# Patient Record
Sex: MALE | Race: OTHER | HISPANIC OR LATINO
[De-identification: names, ages, dates, MRNs, and addresses within clinical notes are randomized per-mention and may not be internally consistent; named-entity substitution may affect disease eponyms.]

---

## 2017-01-03 ENCOUNTER — LABORATORY RESULT (OUTPATIENT)
Age: 57
End: 2017-01-03

## 2017-01-03 ENCOUNTER — APPOINTMENT (OUTPATIENT)
Dept: BARIATRICS/WEIGHT MGMT | Facility: CLINIC | Age: 57
End: 2017-01-03

## 2017-01-03 ENCOUNTER — APPOINTMENT (OUTPATIENT)
Dept: BARIATRICS | Facility: CLINIC | Age: 57
End: 2017-01-03

## 2017-01-03 VITALS
BODY MASS INDEX: 41.75 KG/M2 | WEIGHT: 315 LBS | DIASTOLIC BLOOD PRESSURE: 82 MMHG | HEIGHT: 73 IN | SYSTOLIC BLOOD PRESSURE: 140 MMHG

## 2017-01-03 VITALS — WEIGHT: 315 LBS | BODY MASS INDEX: 48.81 KG/M2

## 2017-01-03 DIAGNOSIS — Z96.41 PRESENCE OF INSULIN PUMP (EXTERNAL) (INTERNAL): ICD-10-CM

## 2017-01-03 DIAGNOSIS — Z78.9 OTHER SPECIFIED HEALTH STATUS: ICD-10-CM

## 2017-01-24 ENCOUNTER — RECORD ABSTRACTING (OUTPATIENT)
Age: 57
End: 2017-01-24

## 2017-01-24 DIAGNOSIS — Z87.442 PERSONAL HISTORY OF URINARY CALCULI: ICD-10-CM

## 2017-01-24 DIAGNOSIS — Z86.79 PERSONAL HISTORY OF OTHER DISEASES OF THE CIRCULATORY SYSTEM: ICD-10-CM

## 2017-01-24 DIAGNOSIS — Z86.39 PERSONAL HISTORY OF OTHER ENDOCRINE, NUTRITIONAL AND METABOLIC DISEASE: ICD-10-CM

## 2017-01-24 DIAGNOSIS — I49.3 VENTRICULAR PREMATURE DEPOLARIZATION: ICD-10-CM

## 2017-01-25 ENCOUNTER — APPOINTMENT (OUTPATIENT)
Dept: BARIATRICS/WEIGHT MGMT | Facility: CLINIC | Age: 57
End: 2017-01-25

## 2017-01-25 ENCOUNTER — APPOINTMENT (OUTPATIENT)
Dept: ELECTROPHYSIOLOGY | Facility: CLINIC | Age: 57
End: 2017-01-25

## 2017-01-25 VITALS — WEIGHT: 315 LBS | HEIGHT: 73 IN | BODY MASS INDEX: 41.75 KG/M2

## 2017-01-25 VITALS
SYSTOLIC BLOOD PRESSURE: 100 MMHG | DIASTOLIC BLOOD PRESSURE: 60 MMHG | BODY MASS INDEX: 47.36 KG/M2 | HEART RATE: 58 BPM | WEIGHT: 315 LBS

## 2017-01-31 ENCOUNTER — INPATIENT (INPATIENT)
Facility: HOSPITAL | Age: 57
LOS: 0 days | Discharge: HOME | DRG: 683 | End: 2017-02-01
Attending: EMERGENCY MEDICINE | Admitting: EMERGENCY MEDICINE
Payer: MEDICAID

## 2017-01-31 VITALS
WEIGHT: 315 LBS | TEMPERATURE: 98 F | HEIGHT: 75 IN | OXYGEN SATURATION: 94 % | RESPIRATION RATE: 19 BRPM | SYSTOLIC BLOOD PRESSURE: 106 MMHG | DIASTOLIC BLOOD PRESSURE: 62 MMHG | HEART RATE: 47 BPM

## 2017-01-31 DIAGNOSIS — I48.2 CHRONIC ATRIAL FIBRILLATION: ICD-10-CM

## 2017-01-31 DIAGNOSIS — E78.5 HYPERLIPIDEMIA, UNSPECIFIED: ICD-10-CM

## 2017-01-31 DIAGNOSIS — G47.33 OBSTRUCTIVE SLEEP APNEA (ADULT) (PEDIATRIC): ICD-10-CM

## 2017-01-31 DIAGNOSIS — I95.9 HYPOTENSION, UNSPECIFIED: ICD-10-CM

## 2017-01-31 DIAGNOSIS — N17.9 ACUTE KIDNEY FAILURE, UNSPECIFIED: ICD-10-CM

## 2017-01-31 DIAGNOSIS — I10 ESSENTIAL (PRIMARY) HYPERTENSION: ICD-10-CM

## 2017-01-31 DIAGNOSIS — I50.9 HEART FAILURE, UNSPECIFIED: ICD-10-CM

## 2017-01-31 DIAGNOSIS — E11.9 TYPE 2 DIABETES MELLITUS WITHOUT COMPLICATIONS: ICD-10-CM

## 2017-01-31 LAB
ALBUMIN SERPL ELPH-MCNC: 3.7 G/DL — SIGNIFICANT CHANGE UP (ref 3.3–5)
ALP SERPL-CCNC: 48 U/L — SIGNIFICANT CHANGE UP (ref 40–120)
ALT FLD-CCNC: 14 U/L — SIGNIFICANT CHANGE UP (ref 12–78)
ANION GAP SERPL CALC-SCNC: 9 MMOL/L — SIGNIFICANT CHANGE UP (ref 5–17)
ANION GAP SERPL CALC-SCNC: 9 MMOL/L — SIGNIFICANT CHANGE UP (ref 5–17)
APTT BLD: 37.4 SEC — SIGNIFICANT CHANGE UP (ref 27.5–37.4)
AST SERPL-CCNC: 13 U/L — LOW (ref 15–37)
BASOPHILS # BLD AUTO: 0.1 K/UL — SIGNIFICANT CHANGE UP (ref 0–0.2)
BASOPHILS NFR BLD AUTO: 0.9 % — SIGNIFICANT CHANGE UP (ref 0–2)
BILIRUB SERPL-MCNC: 0.4 MG/DL — SIGNIFICANT CHANGE UP (ref 0.2–1.2)
BUN SERPL-MCNC: 81 MG/DL — HIGH (ref 7–23)
BUN SERPL-MCNC: 82 MG/DL — HIGH (ref 7–23)
CALCIUM SERPL-MCNC: 8.8 MG/DL — SIGNIFICANT CHANGE UP (ref 8.5–10.1)
CALCIUM SERPL-MCNC: 9.1 MG/DL — SIGNIFICANT CHANGE UP (ref 8.5–10.1)
CHLORIDE SERPL-SCNC: 110 MMOL/L — HIGH (ref 96–108)
CHLORIDE SERPL-SCNC: 111 MMOL/L — HIGH (ref 96–108)
CO2 SERPL-SCNC: 19 MMOL/L — LOW (ref 22–31)
CO2 SERPL-SCNC: 20 MMOL/L — LOW (ref 22–31)
CREAT SERPL-MCNC: 2.5 MG/DL — HIGH (ref 0.5–1.3)
CREAT SERPL-MCNC: 2.6 MG/DL — HIGH (ref 0.5–1.3)
DIGOXIN SERPL-MCNC: 0.7 NG/ML — LOW (ref 0.8–2)
EOSINOPHIL # BLD AUTO: 0.2 K/UL — SIGNIFICANT CHANGE UP (ref 0–0.5)
EOSINOPHIL NFR BLD AUTO: 3.4 % — SIGNIFICANT CHANGE UP (ref 0–6)
GLUCOSE SERPL-MCNC: 70 MG/DL — SIGNIFICANT CHANGE UP (ref 70–99)
GLUCOSE SERPL-MCNC: 87 MG/DL — SIGNIFICANT CHANGE UP (ref 70–99)
HCT VFR BLD CALC: 37.4 % — LOW (ref 39–50)
HGB BLD-MCNC: 11.9 G/DL — LOW (ref 13–17)
INR BLD: 1.4 RATIO — HIGH (ref 0.88–1.16)
LYMPHOCYTES # BLD AUTO: 1.9 K/UL — SIGNIFICANT CHANGE UP (ref 1–3.3)
LYMPHOCYTES # BLD AUTO: 28 % — SIGNIFICANT CHANGE UP (ref 13–44)
MCHC RBC-ENTMCNC: 28.6 PG — SIGNIFICANT CHANGE UP (ref 27–34)
MCHC RBC-ENTMCNC: 31.7 GM/DL — LOW (ref 32–36)
MCV RBC AUTO: 90.2 FL — SIGNIFICANT CHANGE UP (ref 80–100)
MONOCYTES # BLD AUTO: 0.7 K/UL — SIGNIFICANT CHANGE UP (ref 0–0.9)
MONOCYTES NFR BLD AUTO: 10.4 % — HIGH (ref 1–9)
NEUTROPHILS # BLD AUTO: 3.9 K/UL — SIGNIFICANT CHANGE UP (ref 1.8–7.4)
NEUTROPHILS NFR BLD AUTO: 57.3 % — SIGNIFICANT CHANGE UP (ref 43–77)
NT-PROBNP SERPL-SCNC: 1758 PG/ML — HIGH (ref 0–125)
PLATELET # BLD AUTO: 154 K/UL — SIGNIFICANT CHANGE UP (ref 150–400)
POTASSIUM SERPL-MCNC: 5.2 MMOL/L — SIGNIFICANT CHANGE UP (ref 3.5–5.3)
POTASSIUM SERPL-MCNC: 5.6 MMOL/L — HIGH (ref 3.5–5.3)
POTASSIUM SERPL-SCNC: 5.2 MMOL/L — SIGNIFICANT CHANGE UP (ref 3.5–5.3)
POTASSIUM SERPL-SCNC: 5.6 MMOL/L — HIGH (ref 3.5–5.3)
PROT SERPL-MCNC: 8.2 G/DL — SIGNIFICANT CHANGE UP (ref 6–8.3)
PROTHROM AB SERPL-ACNC: 15.6 SEC — HIGH (ref 10–13.1)
RBC # BLD: 4.15 M/UL — LOW (ref 4.2–5.8)
RBC # FLD: 14.3 % — SIGNIFICANT CHANGE UP (ref 10.3–14.5)
SODIUM SERPL-SCNC: 139 MMOL/L — SIGNIFICANT CHANGE UP (ref 135–145)
SODIUM SERPL-SCNC: 139 MMOL/L — SIGNIFICANT CHANGE UP (ref 135–145)
TROPONIN I SERPL-MCNC: <.015 NG/ML — SIGNIFICANT CHANGE UP (ref 0.01–0.04)
WBC # BLD: 6.7 K/UL — SIGNIFICANT CHANGE UP (ref 3.8–10.5)
WBC # FLD AUTO: 6.7 K/UL — SIGNIFICANT CHANGE UP (ref 3.8–10.5)

## 2017-01-31 PROCEDURE — 99284 EMERGENCY DEPT VISIT MOD MDM: CPT

## 2017-01-31 PROCEDURE — 93010 ELECTROCARDIOGRAM REPORT: CPT

## 2017-01-31 PROCEDURE — 99223 1ST HOSP IP/OBS HIGH 75: CPT | Mod: AI,GC

## 2017-01-31 PROCEDURE — 71020: CPT | Mod: 26

## 2017-01-31 RX ORDER — SODIUM CHLORIDE 9 MG/ML
500 INJECTION INTRAMUSCULAR; INTRAVENOUS; SUBCUTANEOUS ONCE
Qty: 0 | Refills: 0 | Status: COMPLETED | OUTPATIENT
Start: 2017-01-31 | End: 2017-01-31

## 2017-01-31 RX ORDER — DEXTROSE 50 % IN WATER 50 %
12.5 SYRINGE (ML) INTRAVENOUS ONCE
Qty: 0 | Refills: 0 | Status: DISCONTINUED | OUTPATIENT
Start: 2017-01-31 | End: 2017-02-01

## 2017-01-31 RX ORDER — GLUCAGON INJECTION, SOLUTION 0.5 MG/.1ML
1 INJECTION, SOLUTION SUBCUTANEOUS ONCE
Qty: 0 | Refills: 0 | Status: DISCONTINUED | OUTPATIENT
Start: 2017-01-31 | End: 2017-02-01

## 2017-01-31 RX ORDER — SODIUM CHLORIDE 9 MG/ML
1000 INJECTION, SOLUTION INTRAVENOUS
Qty: 0 | Refills: 0 | Status: DISCONTINUED | OUTPATIENT
Start: 2017-01-31 | End: 2017-02-01

## 2017-01-31 RX ORDER — PANTOPRAZOLE SODIUM 20 MG/1
40 TABLET, DELAYED RELEASE ORAL
Qty: 0 | Refills: 0 | Status: DISCONTINUED | OUTPATIENT
Start: 2017-01-31 | End: 2017-02-01

## 2017-01-31 RX ORDER — SODIUM CHLORIDE 9 MG/ML
1000 INJECTION INTRAMUSCULAR; INTRAVENOUS; SUBCUTANEOUS
Qty: 0 | Refills: 0 | Status: COMPLETED | OUTPATIENT
Start: 2017-01-31 | End: 2017-02-01

## 2017-01-31 RX ORDER — GABAPENTIN 400 MG/1
300 CAPSULE ORAL DAILY
Qty: 0 | Refills: 0 | Status: DISCONTINUED | OUTPATIENT
Start: 2017-01-31 | End: 2017-02-01

## 2017-01-31 RX ORDER — ATORVASTATIN CALCIUM 80 MG/1
10 TABLET, FILM COATED ORAL AT BEDTIME
Qty: 0 | Refills: 0 | Status: DISCONTINUED | OUTPATIENT
Start: 2017-01-31 | End: 2017-02-01

## 2017-01-31 RX ORDER — CARVEDILOL PHOSPHATE 80 MG/1
6.25 CAPSULE, EXTENDED RELEASE ORAL
Qty: 0 | Refills: 0 | Status: DISCONTINUED | OUTPATIENT
Start: 2017-01-31 | End: 2017-02-01

## 2017-01-31 RX ORDER — ACETAMINOPHEN 500 MG
650 TABLET ORAL EVERY 6 HOURS
Qty: 0 | Refills: 0 | Status: DISCONTINUED | OUTPATIENT
Start: 2017-01-31 | End: 2017-02-01

## 2017-01-31 RX ORDER — SODIUM CHLORIDE 9 MG/ML
3 INJECTION INTRAMUSCULAR; INTRAVENOUS; SUBCUTANEOUS ONCE
Qty: 0 | Refills: 0 | Status: COMPLETED | OUTPATIENT
Start: 2017-01-31 | End: 2017-01-31

## 2017-01-31 RX ORDER — INSULIN LISPRO 100/ML
VIAL (ML) SUBCUTANEOUS
Qty: 0 | Refills: 0 | Status: DISCONTINUED | OUTPATIENT
Start: 2017-01-31 | End: 2017-02-01

## 2017-01-31 RX ORDER — DEXTROSE 50 % IN WATER 50 %
25 SYRINGE (ML) INTRAVENOUS ONCE
Qty: 0 | Refills: 0 | Status: DISCONTINUED | OUTPATIENT
Start: 2017-01-31 | End: 2017-02-01

## 2017-01-31 RX ORDER — GEMFIBROZIL 600 MG
600 TABLET ORAL
Qty: 0 | Refills: 0 | Status: DISCONTINUED | OUTPATIENT
Start: 2017-01-31 | End: 2017-02-01

## 2017-01-31 RX ORDER — CARVEDILOL PHOSPHATE 80 MG/1
12.5 CAPSULE, EXTENDED RELEASE ORAL DAILY
Qty: 0 | Refills: 0 | Status: DISCONTINUED | OUTPATIENT
Start: 2017-01-31 | End: 2017-01-31

## 2017-01-31 RX ORDER — DEXTROSE 50 % IN WATER 50 %
1 SYRINGE (ML) INTRAVENOUS ONCE
Qty: 0 | Refills: 0 | Status: DISCONTINUED | OUTPATIENT
Start: 2017-01-31 | End: 2017-02-01

## 2017-01-31 RX ORDER — APIXABAN 2.5 MG/1
5 TABLET, FILM COATED ORAL
Qty: 0 | Refills: 0 | Status: DISCONTINUED | OUTPATIENT
Start: 2017-01-31 | End: 2017-02-01

## 2017-01-31 RX ADMIN — ATORVASTATIN CALCIUM 10 MILLIGRAM(S): 80 TABLET, FILM COATED ORAL at 22:36

## 2017-01-31 RX ADMIN — SODIUM CHLORIDE 75 MILLILITER(S): 9 INJECTION INTRAMUSCULAR; INTRAVENOUS; SUBCUTANEOUS at 18:26

## 2017-01-31 RX ADMIN — APIXABAN 5 MILLIGRAM(S): 2.5 TABLET, FILM COATED ORAL at 18:39

## 2017-01-31 RX ADMIN — SODIUM CHLORIDE 500 MILLILITER(S): 9 INJECTION INTRAMUSCULAR; INTRAVENOUS; SUBCUTANEOUS at 15:31

## 2017-01-31 RX ADMIN — SODIUM CHLORIDE 3 MILLILITER(S): 9 INJECTION INTRAMUSCULAR; INTRAVENOUS; SUBCUTANEOUS at 11:50

## 2017-01-31 NOTE — ED ADULT TRIAGE NOTE - CHIEF COMPLAINT QUOTE
Pt. discharged from [facility] 1 week ago w/ CHF, went for follow up w/ PMD today and sent in for hypotension 74/40. Pt. states dizziness when getting up from sitting position.

## 2017-01-31 NOTE — H&P ADULT. - PROBLEM SELECTOR PLAN 2
-Consult Renal Dr. Jackson  -unknown baseline Cr.   -recheck AM BMP  -cont IVF  -check urine lytes  -recheck BMP for hyperkalemia tonight (likely 2/2 spironolactone vs CKD)

## 2017-01-31 NOTE — ED PROVIDER NOTE - CONSTITUTIONAL, MLM
normal... obese awake, alert, oriented to person, place, time/situation and in no apparent distress.

## 2017-01-31 NOTE — ED PROVIDER NOTE - MEDICAL DECISION MAKING DETAILS
pt sent with hypotension and bradycardia with hx of CKD, cardiomyopathy EF 25%.  cardiac work up and cardio consult

## 2017-01-31 NOTE — H&P ADULT. - HISTORY OF PRESENT ILLNESS
56 y/o male with PMH of HTN, CKD not on dialysis, DM2, Afib on digoxin and Eliquis, CHF (?EF) with cardiomyopathy (scheduled for pacemaker/defibrillator in March), MIKE on CPAP qhs presented to the ED from PCP due to dizziness 2/2 hypotension with BP 74/40. Pt stated he got dizzy and hypotensive once before during his recent [facility] hospitalization (1/2017) for acute CHF exacerbation. Pt currently feels asymptomatic after 1L NS bolus. Pt denies any current HA, dizziness, syncopal episodes, CP, palpitations or SOB.  Pt states he has 3 pillow orthopnea and is able to walk around the house unassisted without feeling SOB.  Pt denies any fever, abd pain or chills.     In the ED: VS stable: T: 98, HR: 62, BP: 92/44, RR: 19, O2 sats 100% on RA. Pt received 1L NS bolus in ED.  Labs significant for hyperkalemia at 5.6, BUN/Cr: 81/2.5  Imaging: CXR negative for acute disease.   EKG: Afib with slow ventricular response at 41 bpm, bifascicular block, RBBB, LAFB  Pt evaluated by cardiology, Dr. Oakley, in ED.

## 2017-01-31 NOTE — H&P ADULT. - PROBLEM SELECTOR PLAN 1
-Dr. Oakley evaluated pt in ED-appreciate recs  -will hold Lasix and Digoxin per cardiology  -Cont Coreg 12.5mg daily  -Monitor BP with routine vitals  -gentle hydration with NS 75cc/hr x 12 hours-reval in the AM as pt has CHF with ?EF

## 2017-01-31 NOTE — H&P ADULT. - PROBLEM SELECTOR PLAN 5
-pt has insulin pump  -cont HISS for now  -consulted Diabetic Education for management of pump  -routine accuchecks  -hypoglycemia protocol

## 2017-01-31 NOTE — H&P ADULT. - ASSESSMENT
This is a 58 y/o male with PMH of HTN, CKD not on dialysis, DM2, Afib on digoxin and Eliquis, CHF (?EF) with cardiomyopathy (scheduled for pacemaker/defibrillator in March), MIKE on CPAP qhs presented to the ED from PCP due to dizziness 2/2 hypotension with BP 74/40. Pt admitted to tele for hypotension, hyperkalemia and JAY on CKD.

## 2017-01-31 NOTE — ED PROVIDER NOTE - OBJECTIVE STATEMENT
56 y/o M with hx of hypertension, CKD, Afib on dig and Elloquis, CHF with cardiomyopathy scheduled for pacemaker/defibrilator in March.  Pt was seen at PCP office for medical clearance for bariatric surgery today when he was found to be weak and hypotensive at 70/40.  Pt's cardiologist, Dr. Oakley, was called and pt told to come to the ED for further evaluations, stabilization and cardiology consult.

## 2017-01-31 NOTE — H&P ADULT. - PMH
CHF (congestive heart failure)    Chronic atrial fibrillation    DM2 (diabetes mellitus, type 2)    HTN (hypertension)    MIKE (obstructive sleep apnea)

## 2017-01-31 NOTE — H&P ADULT. - ATTENDING COMMENTS
This is a 56 y/o male with PMH of HTN, CKD not on dialysis, DM2, Afib on digoxin and Eliquis, CHF (?EF) with cardiomyopathy (scheduled for pacemaker/defibrillator in March), MIKE on CPAP was sent by his PCP to the ED for dizziness 2/2 hypotension with BP was 74/40. Pt admitted to tele for hypotension, hyperkalemia and JAY on CKD. C/w telemetry monitoring . Recheck BMP.c/w coreg as per cardiology evaluation. Monitor renal function closely avoid nephrotoxins . Renal evaluation. C/w Eliquis. Restart Spirinolactone if hyperkalemia resolved . This is a 58 y/o male with PMH of HTN, CKD not on dialysis, DM2, Afib on digoxin and Eliquis, CHF (?EF) with cardiomyopathy (scheduled for pacemaker/defibrillator in March), MIKE on CPAP was sent by his PCP to the ED for dizziness 2/2 hypotension with BP was 74/40. Pt admitted to tele for hypotension, hyperkalemia and JAY on CKD. C/w telemetry monitoring . Recheck BMP.c/w coreg as per cardiology evaluation. Monitor renal function closely avoid nephrotoxins . Renal evaluation. C/w Eliquis. Restart Spirinolactone if hyperkalemia resolved . DM2 - c/w sliding scale coverage , Monitor CS closely . follow up with endo regarding insulin pump.

## 2017-01-31 NOTE — H&P ADULT. - PROBLEM SELECTOR PLAN 6
-cont coreg 12.5mg daily  -cont enalapril 5mg daily (with hold parameters)  -hold spironolactone 2/2 hyperkalemia  -f/u ECHO (ordered for AM)

## 2017-01-31 NOTE — ED ADULT NURSE NOTE - CHIEF COMPLAINT QUOTE
Pt. discharged from [facility] 1 week ago w/ CHF, went for follow up w/ PMD today and sent in for hypotension 74/40. Pt. states dizziness when getting up from sitting position.

## 2017-02-01 VITALS
HEART RATE: 58 BPM | DIASTOLIC BLOOD PRESSURE: 79 MMHG | RESPIRATION RATE: 18 BRPM | OXYGEN SATURATION: 98 % | TEMPERATURE: 98 F | SYSTOLIC BLOOD PRESSURE: 124 MMHG

## 2017-02-01 LAB
ANION GAP SERPL CALC-SCNC: 11 MMOL/L — SIGNIFICANT CHANGE UP (ref 5–17)
BUN SERPL-MCNC: 77 MG/DL — HIGH (ref 7–23)
CALCIUM SERPL-MCNC: 8.7 MG/DL — SIGNIFICANT CHANGE UP (ref 8.5–10.1)
CHLORIDE SERPL-SCNC: 112 MMOL/L — HIGH (ref 96–108)
CO2 SERPL-SCNC: 18 MMOL/L — LOW (ref 22–31)
CREAT SERPL-MCNC: 2.4 MG/DL — HIGH (ref 0.5–1.3)
GLUCOSE SERPL-MCNC: 115 MG/DL — HIGH (ref 70–99)
HBA1C BLD-MCNC: 8 % — HIGH (ref 4–5.6)
HCT VFR BLD CALC: 37.4 % — LOW (ref 39–50)
HGB BLD-MCNC: 11.9 G/DL — LOW (ref 13–17)
MCHC RBC-ENTMCNC: 28.4 PG — SIGNIFICANT CHANGE UP (ref 27–34)
MCHC RBC-ENTMCNC: 31.8 GM/DL — LOW (ref 32–36)
MCV RBC AUTO: 89.2 FL — SIGNIFICANT CHANGE UP (ref 80–100)
PLATELET # BLD AUTO: 128 K/UL — LOW (ref 150–400)
POTASSIUM SERPL-MCNC: 5.6 MMOL/L — HIGH (ref 3.5–5.3)
POTASSIUM SERPL-SCNC: 5.6 MMOL/L — HIGH (ref 3.5–5.3)
RBC # BLD: 4.19 M/UL — LOW (ref 4.2–5.8)
RBC # FLD: 14.2 % — SIGNIFICANT CHANGE UP (ref 10.3–14.5)
SODIUM SERPL-SCNC: 141 MMOL/L — SIGNIFICANT CHANGE UP (ref 135–145)
WBC # BLD: 5.8 K/UL — SIGNIFICANT CHANGE UP (ref 3.8–10.5)
WBC # FLD AUTO: 5.8 K/UL — SIGNIFICANT CHANGE UP (ref 3.8–10.5)

## 2017-02-01 PROCEDURE — 99239 HOSP IP/OBS DSCHRG MGMT >30: CPT

## 2017-02-01 RX ORDER — APIXABAN 2.5 MG/1
1 TABLET, FILM COATED ORAL
Qty: 60 | Refills: 0 | OUTPATIENT
Start: 2017-02-01 | End: 2017-03-03

## 2017-02-01 RX ORDER — CARVEDILOL PHOSPHATE 80 MG/1
1 CAPSULE, EXTENDED RELEASE ORAL
Qty: 0 | Refills: 0 | COMMUNITY

## 2017-02-01 RX ORDER — FUROSEMIDE 40 MG
1 TABLET ORAL
Qty: 0 | Refills: 0 | COMMUNITY

## 2017-02-01 RX ORDER — DIGOXIN 250 MCG
1 TABLET ORAL
Qty: 0 | Refills: 0 | COMMUNITY

## 2017-02-01 RX ORDER — APIXABAN 2.5 MG/1
1 TABLET, FILM COATED ORAL
Qty: 0 | Refills: 0 | COMMUNITY

## 2017-02-01 RX ORDER — CARVEDILOL PHOSPHATE 80 MG/1
1 CAPSULE, EXTENDED RELEASE ORAL
Qty: 60 | Refills: 0 | OUTPATIENT
Start: 2017-02-01 | End: 2017-03-03

## 2017-02-01 RX ORDER — INSULIN HUMAN 100 [IU]/ML
1 INJECTION, SOLUTION SUBCUTANEOUS
Qty: 0 | Refills: 0 | Status: DISCONTINUED | OUTPATIENT
Start: 2017-02-01 | End: 2017-02-01

## 2017-02-01 RX ADMIN — PANTOPRAZOLE SODIUM 40 MILLIGRAM(S): 20 TABLET, DELAYED RELEASE ORAL at 08:22

## 2017-02-01 RX ADMIN — Medication 10 MILLIGRAM(S): at 05:26

## 2017-02-01 RX ADMIN — Medication 600 MILLIGRAM(S): at 08:21

## 2017-02-01 RX ADMIN — GABAPENTIN 300 MILLIGRAM(S): 400 CAPSULE ORAL at 11:12

## 2017-02-01 RX ADMIN — APIXABAN 5 MILLIGRAM(S): 2.5 TABLET, FILM COATED ORAL at 05:26

## 2017-02-01 RX ADMIN — SODIUM CHLORIDE 75 MILLILITER(S): 9 INJECTION INTRAMUSCULAR; INTRAVENOUS; SUBCUTANEOUS at 05:43

## 2017-02-01 NOTE — DISCHARGE NOTE ADULT - PATIENT PORTAL LINK FT
“You can access the FollowHealth Patient Portal, offered by [facility], by registering with the following website: [email or web address]”

## 2017-02-01 NOTE — DISCHARGE NOTE ADULT - PLAN OF CARE
Resolved Lasix stopped and coreg halved.  Normotensive and asymptomatic since hospitalization.   Please follow up with Dr. Oakley in 5 days  Please follow up with PMD, Dr. Montano in 1 week Decreased Eliquis to 2.5 mg BID  Stopped Lasix  At [place], Cr was 2.1. Pt Cr 2.5 today.   Please follow up with PMD, Dr. Montano in 1 week Well controlled  Continue home medications and maintenance of insulin pump As per patient, EF 20%. Scheduled for AICD placement in March  Continue carvedilol and enalapril  Monitor weight  Please follow up with cardiology, Dr. Masterson in 5 days A-fib HR 50s, asymptomatic  Continue carvedilol, Eliquis  Please follow up with cardiology in 5 days. Continue home CPAP at night Continue lipitor and gemfibrozil  Please follow up with PMD As per patient, EF 21%. Scheduled for AICD placement in March  Continue carvedilol and enalapril  Monitor weight  Please follow up with cardiology, Dr. Masterson in 5 days Continue pravastatin and gemfibrozil  Please follow up with PMD can resume lasix at home prn  Normotensive and asymptomatic since hospitalization.   Please follow up with Dr. Oakley in 5 days  Please follow up with PMD, Dr. Montano in 1 week CKDIII  Decreased Eliquis to 2.5 mg BID  Stopped Lasix  At [place], Cr was 2.1. Pt Cr 2.5 today.   Please follow up with PMD, Dr. Montano in 1 week

## 2017-02-01 NOTE — DISCHARGE NOTE ADULT - ADDITIONAL INSTRUCTIONS
Please follow up with PMD, Dr. Montano in 1 week  Please follow up with cardiologist, Dr. Oakley in 5 days

## 2017-02-01 NOTE — DISCHARGE NOTE ADULT - CARE PLAN
Principal Discharge DX:	Hypotension, unspecified hypotension type  Goal:	Resolved  Instructions for follow-up, activity and diet:	Lasix stopped and coreg halved.  Normotensive and asymptomatic since hospitalization.   Please follow up with Dr. Oakley in 5 days  Please follow up with PMD, Dr. Montano in 1 week  Secondary Diagnosis:	CKD (chronic kidney disease)  Instructions for follow-up, activity and diet:	Decreased Eliquis to 2.5 mg BID  Stopped Lasix  At [place], Cr was 2.1. Pt Cr 2.5 today.   Please follow up with PMD, Dr. Montano in 1 week  Secondary Diagnosis:	DM2 (diabetes mellitus, type 2)  Instructions for follow-up, activity and diet:	Well controlled  Continue home medications and maintenance of insulin pump  Secondary Diagnosis:	CHF (congestive heart failure)  Instructions for follow-up, activity and diet:	As per patient, EF 20%. Scheduled for AICD placement in March  Continue carvedilol and enalapril  Monitor weight  Please follow up with cardiology, Dr. Masterson in 5 days  Secondary Diagnosis:	Chronic atrial fibrillation  Instructions for follow-up, activity and diet:	A-fib HR 50s, asymptomatic  Continue carvedilol, Eliquis  Please follow up with cardiology in 5 days.  Secondary Diagnosis:	MIKE (obstructive sleep apnea)  Instructions for follow-up, activity and diet:	Continue home CPAP at night  Secondary Diagnosis:	Hyperlipemia  Instructions for follow-up, activity and diet:	Continue lipitor and gemfibrozil  Please follow up with PMD Principal Discharge DX:	Hypotension, unspecified hypotension type  Goal:	Resolved  Instructions for follow-up, activity and diet:	Lasix stopped and coreg halved.  Normotensive and asymptomatic since hospitalization.   Please follow up with Dr. Oakley in 5 days  Please follow up with PMD, Dr. Montano in 1 week  Secondary Diagnosis:	CKD (chronic kidney disease)  Instructions for follow-up, activity and diet:	Decreased Eliquis to 2.5 mg BID  Stopped Lasix  At [place], Cr was 2.1. Pt Cr 2.5 today.   Please follow up with PMD, Dr. Montano in 1 week  Secondary Diagnosis:	DM2 (diabetes mellitus, type 2)  Instructions for follow-up, activity and diet:	Well controlled  Continue home medications and maintenance of insulin pump  Secondary Diagnosis:	CHF (congestive heart failure)  Instructions for follow-up, activity and diet:	As per patient, EF 21%. Scheduled for AICD placement in March  Continue carvedilol and enalapril  Monitor weight  Please follow up with cardiology, Dr. Masterson in 5 days  Secondary Diagnosis:	Chronic atrial fibrillation  Instructions for follow-up, activity and diet:	A-fib HR 50s, asymptomatic  Continue carvedilol, Eliquis  Please follow up with cardiology in 5 days.  Secondary Diagnosis:	MIKE (obstructive sleep apnea)  Instructions for follow-up, activity and diet:	Continue home CPAP at night  Secondary Diagnosis:	Hyperlipemia  Instructions for follow-up, activity and diet:	Continue lipitor and gemfibrozil  Please follow up with PMD Principal Discharge DX:	Hypotension, unspecified hypotension type  Goal:	Resolved  Instructions for follow-up, activity and diet:	Lasix stopped and coreg halved.  Normotensive and asymptomatic since hospitalization.   Please follow up with Dr. Oakley in 5 days  Please follow up with PMD, Dr. Montano in 1 week  Secondary Diagnosis:	CKD (chronic kidney disease)  Instructions for follow-up, activity and diet:	Decreased Eliquis to 2.5 mg BID  Stopped Lasix  At [place], Cr was 2.1. Pt Cr 2.5 today.   Please follow up with PMD, Dr. Montano in 1 week  Secondary Diagnosis:	DM2 (diabetes mellitus, type 2)  Instructions for follow-up, activity and diet:	Well controlled  Continue home medications and maintenance of insulin pump  Secondary Diagnosis:	CHF (congestive heart failure)  Instructions for follow-up, activity and diet:	As per patient, EF 21%. Scheduled for AICD placement in March  Continue carvedilol and enalapril  Monitor weight  Please follow up with cardiology, Dr. Masterson in 5 days  Secondary Diagnosis:	Chronic atrial fibrillation  Instructions for follow-up, activity and diet:	A-fib HR 50s, asymptomatic  Continue carvedilol, Eliquis  Please follow up with cardiology in 5 days.  Secondary Diagnosis:	MIKE (obstructive sleep apnea)  Instructions for follow-up, activity and diet:	Continue home CPAP at night  Secondary Diagnosis:	Hyperlipemia  Instructions for follow-up, activity and diet:	Continue lipitor and gemfibrozil  Please follow up with PMD Principal Discharge DX:	Hypotension, unspecified hypotension type  Goal:	Resolved  Instructions for follow-up, activity and diet:	Lasix stopped and coreg halved.  Normotensive and asymptomatic since hospitalization.   Please follow up with Dr. Oakley in 5 days  Please follow up with PMD, Dr. Montano in 1 week  Secondary Diagnosis:	CKD (chronic kidney disease)  Instructions for follow-up, activity and diet:	Decreased Eliquis to 2.5 mg BID  Stopped Lasix  At [place], Cr was 2.1. Pt Cr 2.5 today.   Please follow up with PMD, Dr. Montano in 1 week  Secondary Diagnosis:	DM2 (diabetes mellitus, type 2)  Instructions for follow-up, activity and diet:	Well controlled  Continue home medications and maintenance of insulin pump  Secondary Diagnosis:	CHF (congestive heart failure)  Instructions for follow-up, activity and diet:	As per patient, EF 21%. Scheduled for AICD placement in March  Continue carvedilol and enalapril  Monitor weight  Please follow up with cardiology, Dr. Masterson in 5 days  Secondary Diagnosis:	Chronic atrial fibrillation  Instructions for follow-up, activity and diet:	A-fib HR 50s, asymptomatic  Continue carvedilol, Eliquis  Please follow up with cardiology in 5 days.  Secondary Diagnosis:	MIKE (obstructive sleep apnea)  Instructions for follow-up, activity and diet:	Continue home CPAP at night  Secondary Diagnosis:	Hyperlipemia  Instructions for follow-up, activity and diet:	Continue lipitor and gemfibrozil  Please follow up with PMD Principal Discharge DX:	Hypotension, unspecified hypotension type  Goal:	Resolved  Instructions for follow-up, activity and diet:	Lasix stopped and coreg halved.  Normotensive and asymptomatic since hospitalization.   Please follow up with Dr. Oakley in 5 days  Please follow up with PMD, Dr. Montano in 1 week  Secondary Diagnosis:	CKD (chronic kidney disease)  Instructions for follow-up, activity and diet:	Decreased Eliquis to 2.5 mg BID  Stopped Lasix  At [place], Cr was 2.1. Pt Cr 2.5 today.   Please follow up with PMD, Dr. Montano in 1 week  Secondary Diagnosis:	DM2 (diabetes mellitus, type 2)  Instructions for follow-up, activity and diet:	Well controlled  Continue home medications and maintenance of insulin pump  Secondary Diagnosis:	CHF (congestive heart failure)  Instructions for follow-up, activity and diet:	As per patient, EF 21%. Scheduled for AICD placement in March  Continue carvedilol and enalapril  Monitor weight  Please follow up with cardiology, Dr. Masterson in 5 days  Secondary Diagnosis:	Chronic atrial fibrillation  Instructions for follow-up, activity and diet:	A-fib HR 50s, asymptomatic  Continue carvedilol, Eliquis  Please follow up with cardiology in 5 days.  Secondary Diagnosis:	MIKE (obstructive sleep apnea)  Instructions for follow-up, activity and diet:	Continue home CPAP at night  Secondary Diagnosis:	Hyperlipemia  Instructions for follow-up, activity and diet:	Continue pravastatin and gemfibrozil  Please follow up with PMD Principal Discharge DX:	Hypotension, unspecified hypotension type  Goal:	Resolved  Instructions for follow-up, activity and diet:	can resume lasix at home prn  Normotensive and asymptomatic since hospitalization.   Please follow up with Dr. Oakley in 5 days  Please follow up with PMD, Dr. Montano in 1 week  Secondary Diagnosis:	CKD (chronic kidney disease)  Instructions for follow-up, activity and diet:	CKDIII  Decreased Eliquis to 2.5 mg BID  Stopped Lasix  At [place], Cr was 2.1. Pt Cr 2.5 today.   Please follow up with PMD, Dr. Montano in 1 week  Secondary Diagnosis:	DM2 (diabetes mellitus, type 2)  Instructions for follow-up, activity and diet:	Well controlled  Continue home medications and maintenance of insulin pump  Secondary Diagnosis:	CHF (congestive heart failure)  Instructions for follow-up, activity and diet:	As per patient, EF 21%. Scheduled for AICD placement in March  Continue carvedilol and enalapril  Monitor weight  Please follow up with cardiology, Dr. Masterson in 5 days  Secondary Diagnosis:	Chronic atrial fibrillation  Instructions for follow-up, activity and diet:	A-fib HR 50s, asymptomatic  Continue carvedilol, Eliquis  Please follow up with cardiology in 5 days.  Secondary Diagnosis:	MIKE (obstructive sleep apnea)  Instructions for follow-up, activity and diet:	Continue home CPAP at night  Secondary Diagnosis:	Hyperlipemia  Instructions for follow-up, activity and diet:	Continue pravastatin and gemfibrozil  Please follow up with PMD Principal Discharge DX:	Hypotension, unspecified hypotension type  Goal:	Resolved  Instructions for follow-up, activity and diet:	can resume lasix at home prn  Normotensive and asymptomatic since hospitalization.   Please follow up with Dr. Oakley in 5 days  Please follow up with PMD, Dr. Montano in 1 week  Secondary Diagnosis:	CKD (chronic kidney disease)  Instructions for follow-up, activity and diet:	CKDIII  Decreased Eliquis to 2.5 mg BID  Stopped Lasix  At [place], Cr was 2.1. Pt Cr 2.5 today.   Please follow up with PMD, Dr. Montano in 1 week  Secondary Diagnosis:	DM2 (diabetes mellitus, type 2)  Instructions for follow-up, activity and diet:	Well controlled  Continue home medications and maintenance of insulin pump  Secondary Diagnosis:	CHF (congestive heart failure)  Instructions for follow-up, activity and diet:	As per patient, EF 21%. Scheduled for AICD placement in March  Continue carvedilol and enalapril  Monitor weight  Please follow up with cardiology, Dr. Masterson in 5 days  Secondary Diagnosis:	Chronic atrial fibrillation  Instructions for follow-up, activity and diet:	A-fib HR 50s, asymptomatic  Continue carvedilol, Eliquis  Please follow up with cardiology in 5 days.  Secondary Diagnosis:	MIKE (obstructive sleep apnea)  Instructions for follow-up, activity and diet:	Continue home CPAP at night  Secondary Diagnosis:	Hyperlipemia  Instructions for follow-up, activity and diet:	Continue pravastatin and gemfibrozil  Please follow up with PMD Principal Discharge DX:	Hypotension, unspecified hypotension type  Goal:	Resolved  Instructions for follow-up, activity and diet:	can resume lasix at home prn  Normotensive and asymptomatic since hospitalization.   Please follow up with Dr. Oakley in 5 days  Please follow up with PMD, Dr. Montano in 1 week  Secondary Diagnosis:	CKD (chronic kidney disease)  Instructions for follow-up, activity and diet:	CKDIII  Decreased Eliquis to 2.5 mg BID  Stopped Lasix  At [place], Cr was 2.1. Pt Cr 2.5 today.   Please follow up with PMD, Dr. Montano in 1 week  Secondary Diagnosis:	DM2 (diabetes mellitus, type 2)  Instructions for follow-up, activity and diet:	Well controlled  Continue home medications and maintenance of insulin pump  Secondary Diagnosis:	CHF (congestive heart failure)  Instructions for follow-up, activity and diet:	As per patient, EF 21%. Scheduled for AICD placement in March  Continue carvedilol and enalapril  Monitor weight  Please follow up with cardiology, Dr. Masterson in 5 days  Secondary Diagnosis:	Chronic atrial fibrillation  Instructions for follow-up, activity and diet:	A-fib HR 50s, asymptomatic  Continue carvedilol, Eliquis  Please follow up with cardiology in 5 days.  Secondary Diagnosis:	MIKE (obstructive sleep apnea)  Instructions for follow-up, activity and diet:	Continue home CPAP at night  Secondary Diagnosis:	Hyperlipemia  Instructions for follow-up, activity and diet:	Continue pravastatin and gemfibrozil  Please follow up with PMD

## 2017-02-01 NOTE — DISCHARGE NOTE ADULT - MEDICATION SUMMARY - MEDICATIONS TO TAKE
I will START or STAY ON the medications listed below when I get home from the hospital:    spironolactone 25 mg oral tablet  -- 1 tab(s) by mouth once a day  -- Indication: For CHF (congestive heart failure)    Vasotec 10 mg oral tablet  -- 1 tab(s) by mouth once a day  -- Indication: For HTN (hypertension)    Eliquis 2.5 mg oral tablet  -- 1 tab(s) by mouth 2 times a day  -- Check with your doctor before becoming pregnant.  It is very important that you take or use this exactly as directed.  Do not skip doses or discontinue unless directed by your doctor.  Obtain medical advice before taking any non-prescription drugs as some may affect the action of this medication.    -- Indication: For Afib    gabapentin 300 mg oral capsule  --  by mouth once a day  -- Indication: For neuropathy    HumuLIN R (Concentrated) 500 units/mL human recombinant subcutaneous solution  -- 1 unit(s) subcutaneous once a day  -- Indication: For DM2 (diabetes mellitus, type 2)    gemfibrozil 600 mg oral tablet  -- 1 tab(s) by mouth 2 times a day  -- Indication: For DM2 (diabetes mellitus, type 2)    pravastatin 40 mg oral tablet  -- 1 tab(s) by mouth once a day  -- Indication: For Hld    carvedilol 6.25 mg oral tablet  -- 1 tab(s) by mouth 2 times a day  -- Indication: For Cad    tiZANidine 4 mg oral capsule  --  by mouth , As Needed  -- Indication: For back spasm    pantoprazole 40 mg oral delayed release tablet  -- 1 tab(s) by mouth once a day  -- Indication: For gerd

## 2017-02-01 NOTE — ADVANCED PRACTICE NURSE CONSULT - RECOMMEDATIONS
recommend endocrine if not being discharged today  Diabetes wellness referral for insulin pump class.

## 2017-02-01 NOTE — DISCHARGE NOTE ADULT - SECONDARY DIAGNOSIS.
CKD (chronic kidney disease) DM2 (diabetes mellitus, type 2) CHF (congestive heart failure) Chronic atrial fibrillation MIKE (obstructive sleep apnea) Hyperlipemia

## 2017-02-01 NOTE — ADVANCED PRACTICE NURSE CONSULT - REASON FOR CONSULT
58 yo male Adm 1/31 Hypotension   PMHx: HF, AF, HTN, MIKE   T2 DM on insulin pump U500 Never had a class only rep to set up  Endo: Dr. Johnson in [place] last seen one week ago

## 2017-02-01 NOTE — DISCHARGE NOTE ADULT - HOSPITAL COURSE
56 y/o male with PMH of HTN, CKD not on dialysis, DM2, Afib on digoxin and Eliquis, CHF (EF 20%) with cardiomyopathy (scheduled for pacemaker/defibrillator in March), MIKE on CPAP qhs presented to the ED from PCP due to dizziness 2/2 hypotension with BP 74/40. Pt stated he got dizzy and hypotensive once before during his recent [place] hospitalization (1/2017) for acute CHF exacerbation. Pt felt asymptomatic after 1L NS bolus. Pt denies any current HA, dizziness, syncopal episodes, CP, palpitations or SOB.  Pt states he has 3 pillow orthopnea and is able to walk around the house unassisted without feeling SOB.  Pt denied any fever, abd pain or chills.     In the ED: VS stable: T: 98, HR: 62, BP: 92/44, RR: 19, O2 sats 100% on RA. Pt received 1L NS bolus in ED.  Labs significant for hyperkalemia at 5.6, BUN/Cr: 81/2.5  Imaging: CXR negative for acute disease.   EKG: Afib with slow ventricular response at 41 bpm, bifascicular block, RBBB, LAFB  Pt evaluated by cardiology, Dr. Oakley, in ED.    Patient admitted to telemetry floor for hypotension, hyperkalemia, JAY on CKD. The patient was seen by Dr. Masterson, cardiology. Lasix and digoxin were stopped. Coreg dosage was decreased. The patient was normotensive and asymptomatic since being on the unit. The patient is scheduled to have an AICD placed in March. The patient. The patient has CKD3 which was at baseline from previous admission at [facility], as per Dr. Masterson. The patient was in bradycardic A-fib in the 50s. Asymptomatic. The patient was continued on carvedilol. The patient's Eliquis was halved due to renal function. The patient had DM2 with an insulin pump. The pump was maintained by the diabetes specialist. Diabetic neuropathy was controlled with Neurontin. HTN was controlled using enalapril and carvedilol. Chronic HLD was controlled with gemfibrozil and atorvastatin. The patient is stable for discharge home.     Pt seen and examined.  Vitals: /71, HR 72, RR 18, SPO2 100% on RA, T 97.4  General: NAD, well appearing  Cardio: irregular rhythm, bradycardic, +S1/S2, no murmurs  Lungs: clear to auscultation, no wheezes  Abdomen: +BSx4, soft, non-tender  Ext: +1 LE edema, +2 PT pulses B/L  Neuro: A+Ox3 58 y/o male with PMH of HTN, CKD not on dialysis, DM2, Afib on digoxin and Eliquis, CHF (EF 20%) with cardiomyopathy (scheduled for pacemaker/defibrillator in March), MIKE on CPAP qhs presented to the ED from PCP due to dizziness 2/2 hypotension with BP 74/40. Pt stated he got dizzy and hypotensive once before during his recent [place] hospitalization (1/2017) for acute CHF exacerbation. Pt felt asymptomatic after 1L NS bolus. Pt denies any current HA, dizziness, syncopal episodes, CP, palpitations or SOB.  Pt states he has 3 pillow orthopnea and is able to walk around the house unassisted without feeling SOB.  Pt denied any fever, abd pain or chills.     In the ED: VS stable: T: 98, HR: 62, BP: 92/44, RR: 19, O2 sats 100% on RA. Pt received 1L NS bolus in ED.  Labs significant for hyperkalemia at 5.6, BUN/Cr: 81/2.5  Imaging: CXR negative for acute disease.   EKG: Afib with slow ventricular response at 41 bpm, bifascicular block, RBBB, LAFB  Pt evaluated by cardiology, Dr. Oakley, in ED.    Patient admitted to telemetry floor for hypotension, hyperkalemia, JAY on CKD. The patient was seen by Dr. Masterson, cardiology. Lasix and digoxin were stopped. Coreg dosage was decreased. The patient was normotensive and asymptomatic since being on the unit. The patient is scheduled to have an AICD placed in March. The patient. The patient has CKD3 which was at baseline from previous admission at [facility], as per Dr. Masterson. The patient was in bradycardic A-fib in the 50s. Asymptomatic. The patient was continued on carvedilol. The patient's Eliquis was halved due to renal function. The patient had DM2 with an insulin pump. The pump was maintained by the diabetes specialist. Diabetic neuropathy was controlled with Neurontin. HTN was controlled using enalapril and carvedilol. Chronic HLD was controlled with gemfibrozil and atorvastatin. The patient is stable for discharge home.     Pt seen and examined.  Vitals: /71, HR 72, RR 18, SPO2 100% on RA, T 97.4  General: NAD, well appearing  Cardio: irregular rhythm, bradycardic, +S1/S2, no murmurs  Lungs: clear to auscultation, no wheezes  Abdomen: +BSx4, soft, non-tender  Ext: +1 LE edema, +1 PT pulses B/L  Neuro: A+Ox3 56 y/o male with PMH of HTN, CKD not on dialysis, DM2, Afib on digoxin and Eliquis, CHF (EF 21%) with cardiomyopathy (scheduled for pacemaker/defibrillator in March), MIKE on CPAP qhs presented to the ED from PCP due to dizziness 2/2 hypotension with BP 74/40. Pt stated he got dizzy and hypotensive once before during his recent [place] hospitalization (1/2017) for acute CHF exacerbation. Pt felt asymptomatic after 1L NS bolus. Pt denies any current HA, dizziness, syncopal episodes, CP, palpitations or SOB.  Pt states he has 3 pillow orthopnea and is able to walk around the house unassisted without feeling SOB.  Pt denied any fever, abd pain or chills.     In the ED: VS stable: T: 98, HR: 62, BP: 92/44, RR: 19, O2 sats 100% on RA. Pt received 1L NS bolus in ED.  Labs significant for hyperkalemia at 5.6, BUN/Cr: 81/2.5  Imaging: CXR negative for acute disease.   EKG: Afib with slow ventricular response at 41 bpm, bifascicular block, RBBB, LAFB  Pt evaluated by cardiology, Dr. Oakley, in ED.    Patient admitted to telemetry floor for hypotension, hyperkalemia, JAY on CKD. The patient was seen by Dr. Masterson, cardiology. Lasix and digoxin were stopped. Coreg dosage was decreased. The patient was normotensive and asymptomatic since being on the unit. The patient is scheduled to have an AICD placed in March. The patient. The patient has CKD3 which was at baseline from previous admission at [facility], as per Dr. Masterson. The patient was in bradycardic A-fib in the 50s. Asymptomatic. The patient was continued on carvedilol. The patient's Eliquis was halved due to renal function. The patient had DM2 with an insulin pump. The pump was maintained by the diabetes specialist. Diabetic neuropathy was controlled with Neurontin. HTN was controlled using enalapril and carvedilol. Chronic HLD was controlled with gemfibrozil and atorvastatin. The patient is stable for discharge home.     Pt seen and examined.  Vitals: /71, HR 72, RR 18, SPO2 100% on RA, T 97.4  General: NAD, well appearing  Cardio: irregular rhythm, bradycardic, +S1/S2, no murmurs  Lungs: clear to auscultation, no wheezes  Abdomen: +BSx4, soft, non-tender  Ext: +1 LE edema, +1 PT pulses B/L  Neuro: A+Ox3

## 2017-02-01 NOTE — DISCHARGE NOTE ADULT - CARE PROVIDER_API CALL
Navarro Montano (DO), Family Medicine  [address] [place] [address]  Phone: [phone]  Fax: [phone]    Ryan Oakley (DO), Cardiovascular Disease; Internal Medicine; Interventional Cardiology  [address] [place] [address] [address] [place] [address]  Phone: [phone]  Fax: [phone]

## 2017-02-01 NOTE — DISCHARGE NOTE ADULT - NS AS ACTIVITY OBS
Walking-Outdoors allowed/Showering allowed/No Heavy lifting/straining/Driving allowed/Walking-Indoors allowed

## 2017-02-01 NOTE — DISCHARGE NOTE ADULT - MEDICATION SUMMARY - MEDICATIONS TO STOP TAKING
I will STOP taking the medications listed below when I get home from the hospital:    digoxin 125 mcg (0.125 mg) oral tablet  -- 1 tab(s) by mouth once a day    enalapril 10 mg oral tablet  --  by mouth 2 times a day

## 2017-02-06 DIAGNOSIS — Z87.891 PERSONAL HISTORY OF NICOTINE DEPENDENCE: ICD-10-CM

## 2017-02-06 DIAGNOSIS — E11.40 TYPE 2 DIABETES MELLITUS WITH DIABETIC NEUROPATHY, UNSPECIFIED: ICD-10-CM

## 2017-02-06 DIAGNOSIS — R00.1 BRADYCARDIA, UNSPECIFIED: ICD-10-CM

## 2017-02-06 DIAGNOSIS — I13.0 HYPERTENSIVE HEART AND CHRONIC KIDNEY DISEASE WITH HEART FAILURE AND STAGE 1 THROUGH STAGE 4 CHRONIC KIDNEY DISEASE, OR UNSPECIFIED CHRONIC KIDNEY DISEASE: ICD-10-CM

## 2017-02-06 DIAGNOSIS — E87.5 HYPERKALEMIA: ICD-10-CM

## 2017-02-06 DIAGNOSIS — E86.0 DEHYDRATION: ICD-10-CM

## 2017-02-06 DIAGNOSIS — N17.9 ACUTE KIDNEY FAILURE, UNSPECIFIED: ICD-10-CM

## 2017-02-06 DIAGNOSIS — G47.33 OBSTRUCTIVE SLEEP APNEA (ADULT) (PEDIATRIC): ICD-10-CM

## 2017-02-06 DIAGNOSIS — Z79.4 LONG TERM (CURRENT) USE OF INSULIN: ICD-10-CM

## 2017-02-06 DIAGNOSIS — I42.9 CARDIOMYOPATHY, UNSPECIFIED: ICD-10-CM

## 2017-02-06 DIAGNOSIS — N18.3 CHRONIC KIDNEY DISEASE, STAGE 3 (MODERATE): ICD-10-CM

## 2017-02-06 DIAGNOSIS — I45.2 BIFASCICULAR BLOCK: ICD-10-CM

## 2017-02-06 DIAGNOSIS — G62.9 POLYNEUROPATHY, UNSPECIFIED: ICD-10-CM

## 2017-02-06 DIAGNOSIS — E66.9 OBESITY, UNSPECIFIED: ICD-10-CM

## 2017-02-06 DIAGNOSIS — E78.5 HYPERLIPIDEMIA, UNSPECIFIED: ICD-10-CM

## 2017-02-06 DIAGNOSIS — I48.2 CHRONIC ATRIAL FIBRILLATION: ICD-10-CM

## 2017-02-06 DIAGNOSIS — Z79.01 LONG TERM (CURRENT) USE OF ANTICOAGULANTS: ICD-10-CM

## 2017-02-06 DIAGNOSIS — I50.22 CHRONIC SYSTOLIC (CONGESTIVE) HEART FAILURE: ICD-10-CM

## 2017-02-06 DIAGNOSIS — Z96.41 PRESENCE OF INSULIN PUMP (EXTERNAL) (INTERNAL): ICD-10-CM

## 2017-03-01 ENCOUNTER — OUTPATIENT (OUTPATIENT)
Dept: OUTPATIENT SERVICES | Facility: HOSPITAL | Age: 57
LOS: 1 days | End: 2017-03-01
Payer: MEDICAID

## 2017-03-01 VITALS
HEIGHT: 73 IN | DIASTOLIC BLOOD PRESSURE: 57 MMHG | TEMPERATURE: 97 F | HEART RATE: 65 BPM | RESPIRATION RATE: 20 BRPM | SYSTOLIC BLOOD PRESSURE: 89 MMHG | WEIGHT: 315 LBS

## 2017-03-01 VITALS
TEMPERATURE: 97 F | DIASTOLIC BLOOD PRESSURE: 57 MMHG | HEIGHT: 73 IN | RESPIRATION RATE: 18 BRPM | SYSTOLIC BLOOD PRESSURE: 89 MMHG | HEART RATE: 51 BPM | WEIGHT: 315 LBS | OXYGEN SATURATION: 95 %

## 2017-03-01 DIAGNOSIS — I48.2 CHRONIC ATRIAL FIBRILLATION: ICD-10-CM

## 2017-03-01 DIAGNOSIS — Z01.810 ENCOUNTER FOR PREPROCEDURAL CARDIOVASCULAR EXAMINATION: ICD-10-CM

## 2017-03-01 DIAGNOSIS — I50.9 HEART FAILURE, UNSPECIFIED: ICD-10-CM

## 2017-03-01 LAB
ANION GAP SERPL CALC-SCNC: 12 MMOL/L — SIGNIFICANT CHANGE UP (ref 5–17)
ANION GAP SERPL CALC-SCNC: 14 MMOL/L — SIGNIFICANT CHANGE UP (ref 5–17)
APTT BLD: 35.7 SEC — SIGNIFICANT CHANGE UP (ref 27.5–37.4)
BLD GP AB SCN SERPL QL: SIGNIFICANT CHANGE UP
BUN SERPL-MCNC: 85 MG/DL — HIGH (ref 8–20)
BUN SERPL-MCNC: 85 MG/DL — HIGH (ref 8–20)
CALCIUM SERPL-MCNC: 9.1 MG/DL — SIGNIFICANT CHANGE UP (ref 8.6–10.2)
CALCIUM SERPL-MCNC: 9.1 MG/DL — SIGNIFICANT CHANGE UP (ref 8.6–10.2)
CHLORIDE SERPL-SCNC: 110 MMOL/L — HIGH (ref 98–107)
CHLORIDE SERPL-SCNC: 111 MMOL/L — HIGH (ref 98–107)
CO2 SERPL-SCNC: 15 MMOL/L — LOW (ref 22–29)
CO2 SERPL-SCNC: 17 MMOL/L — LOW (ref 22–29)
CREAT SERPL-MCNC: 2.4 MG/DL — HIGH (ref 0.5–1.3)
CREAT SERPL-MCNC: 2.54 MG/DL — HIGH (ref 0.5–1.3)
GLUCOSE SERPL-MCNC: 119 MG/DL — HIGH (ref 70–115)
GLUCOSE SERPL-MCNC: 137 MG/DL — HIGH (ref 70–115)
HCT VFR BLD CALC: 38.9 % — LOW (ref 42–52)
HGB BLD-MCNC: 12.6 G/DL — LOW (ref 14–18)
HIV 1 & 2 AB SERPL IA.RAPID: SIGNIFICANT CHANGE UP
INR BLD: 1.28 RATIO — HIGH (ref 0.88–1.16)
MCHC RBC-ENTMCNC: 28.8 PG — SIGNIFICANT CHANGE UP (ref 27–31)
MCHC RBC-ENTMCNC: 32.4 G/DL — SIGNIFICANT CHANGE UP (ref 32–36)
MCV RBC AUTO: 88.8 FL — SIGNIFICANT CHANGE UP (ref 80–94)
PLATELET # BLD AUTO: 163 K/UL — SIGNIFICANT CHANGE UP (ref 150–400)
POTASSIUM SERPL-MCNC: 5.9 MMOL/L — HIGH (ref 3.5–5.3)
POTASSIUM SERPL-MCNC: 6 MMOL/L — HIGH (ref 3.5–5.3)
POTASSIUM SERPL-SCNC: 5.9 MMOL/L — HIGH (ref 3.5–5.3)
POTASSIUM SERPL-SCNC: 6 MMOL/L — HIGH (ref 3.5–5.3)
PROTHROM AB SERPL-ACNC: 14.1 SEC — HIGH (ref 10–13.1)
RBC # BLD: 4.38 M/UL — LOW (ref 4.6–6.2)
RBC # FLD: 14.7 % — SIGNIFICANT CHANGE UP (ref 11–15.6)
SODIUM SERPL-SCNC: 139 MMOL/L — SIGNIFICANT CHANGE UP (ref 135–145)
SODIUM SERPL-SCNC: 140 MMOL/L — SIGNIFICANT CHANGE UP (ref 135–145)
TYPE + AB SCN PNL BLD: SIGNIFICANT CHANGE UP
WBC # BLD: 6.6 K/UL — SIGNIFICANT CHANGE UP (ref 4.8–10.8)
WBC # FLD AUTO: 6.6 K/UL — SIGNIFICANT CHANGE UP (ref 4.8–10.8)

## 2017-03-01 PROCEDURE — 86850 RBC ANTIBODY SCREEN: CPT

## 2017-03-01 PROCEDURE — 86803 HEPATITIS C AB TEST: CPT

## 2017-03-01 PROCEDURE — 85610 PROTHROMBIN TIME: CPT

## 2017-03-01 PROCEDURE — 93010 ELECTROCARDIOGRAM REPORT: CPT

## 2017-03-01 PROCEDURE — 80048 BASIC METABOLIC PNL TOTAL CA: CPT

## 2017-03-01 PROCEDURE — 85027 COMPLETE CBC AUTOMATED: CPT

## 2017-03-01 PROCEDURE — 93005 ELECTROCARDIOGRAM TRACING: CPT

## 2017-03-01 PROCEDURE — 86900 BLOOD TYPING SEROLOGIC ABO: CPT

## 2017-03-01 PROCEDURE — G0463: CPT

## 2017-03-01 PROCEDURE — 86703 HIV-1/HIV-2 1 RESULT ANTBDY: CPT

## 2017-03-01 PROCEDURE — 86901 BLOOD TYPING SEROLOGIC RH(D): CPT

## 2017-03-01 PROCEDURE — 85730 THROMBOPLASTIN TIME PARTIAL: CPT

## 2017-03-01 RX ORDER — INSULIN HUMAN 100 [IU]/ML
1 INJECTION, SOLUTION SUBCUTANEOUS
Qty: 0 | Refills: 0 | COMMUNITY

## 2017-03-01 RX ORDER — SODIUM POLYSTYRENE SULFONATE 4.1 MEQ/G
60 POWDER, FOR SUSPENSION ORAL
Qty: 1 | Refills: 0 | OUTPATIENT
Start: 2017-03-01 | End: 2017-03-02

## 2017-03-01 RX ORDER — SODIUM POLYSTYRENE SULFONATE 4.1 MEQ/G
60 POWDER, FOR SUSPENSION ORAL
Qty: 1 | Refills: 0 | OUTPATIENT
Start: 2017-03-01

## 2017-03-01 NOTE — H&P PST ADULT - HISTORY OF PRESENT ILLNESS
57 year old male with history of CHF, afib (on eliquis), DM (insulin pump), MIKE who has been hospitalized recently for CHF and found to have NSVT on holter with EF 41%. For elective BiV ICD.  Of note...pt has history of hypotension when taking lasix.     Cardio: Adin 57 year old male with history of CHF, afib (on eliquis), DM (insulin pump), MIKE who has been hospitalized recently for CHF and found to have NSVT on holter with EF 20-25%. For elective BiV ICD.  Of note...pt has history of hypotension when taking lasix.     Cardio: Adin

## 2017-03-01 NOTE — H&P PST ADULT - PMH
CHF (congestive heart failure)    Chronic atrial fibrillation    DM2 (diabetes mellitus, type 2)    Gastritis    HTN (hypertension)    Hypotension    Obesity    MIKE (obstructive sleep apnea) CHF (congestive heart failure)    Chronic atrial fibrillation    DM2 (diabetes mellitus, type 2)    Gastritis    HTN (hypertension)    Hypotension    Obesity    MIKE (obstructive sleep apnea)    Renal insufficiency

## 2017-03-01 NOTE — H&P PST ADULT - ASSESSMENT
57 year old male 57 year old male with history of CHF, 57 year old male with history of CHF, Afib, severe LV dysfunction, NYHA class III who is referred for BiV ICD. 57 year old male with history of CHF, Afib, severe LV dysfunction, NYHA class III who is referred for BiV ICD.     Potassium level elevated.  Spoke with Dr. Baker.  Rx for kayelexate sent.  Spoke with pts wife.  Will have f/u K+ level checked on friday with Dr. Oneil.  Pt will call Dr. Baker if level does not decrease.

## 2017-03-01 NOTE — H&P PST ADULT - FAMILY HISTORY
Father  Still living? Yes, Estimated age: Age Unknown  Family history of stroke, Age at diagnosis: Age Unknown  Family history of heart disease, Age at diagnosis: Age Unknown  Family history of CHF (congestive heart failure), Age at diagnosis: Age Unknown

## 2017-03-01 NOTE — ASU PATIENT PROFILE, ADULT - PMH
CHF (congestive heart failure)    Chronic atrial fibrillation    DM2 (diabetes mellitus, type 2)    Gastritis    HTN (hypertension)    Hypotension    Obesity    MIKE (obstructive sleep apnea)    Renal insufficiency

## 2017-03-02 DIAGNOSIS — I25.10 ATHEROSCLEROTIC HEART DISEASE OF NATIVE CORONARY ARTERY WITHOUT ANGINA PECTORIS: ICD-10-CM

## 2017-03-02 DIAGNOSIS — Z01.810 ENCOUNTER FOR PREPROCEDURAL CARDIOVASCULAR EXAMINATION: ICD-10-CM

## 2017-03-02 LAB
HCV AB S/CO SERPL IA: 0.25 S/CO — SIGNIFICANT CHANGE UP
HCV AB SERPL-IMP: SIGNIFICANT CHANGE UP

## 2017-03-06 ENCOUNTER — INPATIENT (INPATIENT)
Facility: HOSPITAL | Age: 57
LOS: 0 days | Discharge: HOME | DRG: 227 | End: 2017-03-07
Attending: STUDENT IN AN ORGANIZED HEALTH CARE EDUCATION/TRAINING PROGRAM | Admitting: STUDENT IN AN ORGANIZED HEALTH CARE EDUCATION/TRAINING PROGRAM
Payer: MEDICAID

## 2017-03-06 ENCOUNTER — TRANSCRIPTION ENCOUNTER (OUTPATIENT)
Age: 57
End: 2017-03-06

## 2017-03-06 VITALS
DIASTOLIC BLOOD PRESSURE: 75 MMHG | HEART RATE: 70 BPM | TEMPERATURE: 98 F | OXYGEN SATURATION: 96 % | RESPIRATION RATE: 16 BRPM | SYSTOLIC BLOOD PRESSURE: 106 MMHG

## 2017-03-06 DIAGNOSIS — I50.9 HEART FAILURE, UNSPECIFIED: ICD-10-CM

## 2017-03-06 DIAGNOSIS — Z01.810 ENCOUNTER FOR PREPROCEDURAL CARDIOVASCULAR EXAMINATION: ICD-10-CM

## 2017-03-06 LAB
ANION GAP SERPL CALC-SCNC: 14 MMOL/L — SIGNIFICANT CHANGE UP (ref 5–17)
BLD GP AB SCN SERPL QL: SIGNIFICANT CHANGE UP
BUN SERPL-MCNC: 85 MG/DL — HIGH (ref 8–20)
CALCIUM SERPL-MCNC: 9.3 MG/DL — SIGNIFICANT CHANGE UP (ref 8.6–10.2)
CHLORIDE SERPL-SCNC: 106 MMOL/L — SIGNIFICANT CHANGE UP (ref 98–107)
CO2 SERPL-SCNC: 17 MMOL/L — LOW (ref 22–29)
CREAT SERPL-MCNC: 2.31 MG/DL — HIGH (ref 0.5–1.3)
GLUCOSE SERPL-MCNC: 83 MG/DL — SIGNIFICANT CHANGE UP (ref 70–115)
POTASSIUM SERPL-MCNC: 5.3 MMOL/L — SIGNIFICANT CHANGE UP (ref 3.5–5.3)
POTASSIUM SERPL-SCNC: 5.3 MMOL/L — SIGNIFICANT CHANGE UP (ref 3.5–5.3)
SODIUM SERPL-SCNC: 137 MMOL/L — SIGNIFICANT CHANGE UP (ref 135–145)
TYPE + AB SCN PNL BLD: SIGNIFICANT CHANGE UP

## 2017-03-06 PROCEDURE — 33225 L VENTRIC PACING LEAD ADD-ON: CPT

## 2017-03-06 PROCEDURE — 71010: CPT | Mod: 26

## 2017-03-06 PROCEDURE — 33249 INSJ/RPLCMT DEFIB W/LEAD(S): CPT

## 2017-03-06 PROCEDURE — 93010 ELECTROCARDIOGRAM REPORT: CPT

## 2017-03-06 PROCEDURE — 71020: CPT | Mod: 26

## 2017-03-06 RX ORDER — CARVEDILOL PHOSPHATE 80 MG/1
12.5 CAPSULE, EXTENDED RELEASE ORAL EVERY 12 HOURS
Qty: 0 | Refills: 0 | Status: DISCONTINUED | OUTPATIENT
Start: 2017-03-06 | End: 2017-03-07

## 2017-03-06 RX ORDER — GEMFIBROZIL 600 MG
600 TABLET ORAL
Qty: 0 | Refills: 0 | Status: DISCONTINUED | OUTPATIENT
Start: 2017-03-06 | End: 2017-03-07

## 2017-03-06 RX ORDER — ONDANSETRON 8 MG/1
4 TABLET, FILM COATED ORAL EVERY 6 HOURS
Qty: 0 | Refills: 0 | Status: DISCONTINUED | OUTPATIENT
Start: 2017-03-06 | End: 2017-03-07

## 2017-03-06 RX ORDER — DEXTROSE 50 % IN WATER 50 %
25 SYRINGE (ML) INTRAVENOUS ONCE
Qty: 0 | Refills: 0 | Status: DISCONTINUED | OUTPATIENT
Start: 2017-03-06 | End: 2017-03-07

## 2017-03-06 RX ORDER — DEXTROSE 50 % IN WATER 50 %
1 SYRINGE (ML) INTRAVENOUS ONCE
Qty: 0 | Refills: 0 | Status: DISCONTINUED | OUTPATIENT
Start: 2017-03-06 | End: 2017-03-07

## 2017-03-06 RX ORDER — GABAPENTIN 400 MG/1
300 CAPSULE ORAL DAILY
Qty: 0 | Refills: 0 | Status: DISCONTINUED | OUTPATIENT
Start: 2017-03-06 | End: 2017-03-07

## 2017-03-06 RX ORDER — CARVEDILOL PHOSPHATE 80 MG/1
6.25 CAPSULE, EXTENDED RELEASE ORAL EVERY 12 HOURS
Qty: 0 | Refills: 0 | Status: DISCONTINUED | OUTPATIENT
Start: 2017-03-06 | End: 2017-03-06

## 2017-03-06 RX ORDER — SPIRONOLACTONE 25 MG/1
25 TABLET, FILM COATED ORAL DAILY
Qty: 0 | Refills: 0 | Status: DISCONTINUED | OUTPATIENT
Start: 2017-03-06 | End: 2017-03-07

## 2017-03-06 RX ORDER — ACETAMINOPHEN 500 MG
650 TABLET ORAL EVERY 6 HOURS
Qty: 0 | Refills: 0 | Status: DISCONTINUED | OUTPATIENT
Start: 2017-03-06 | End: 2017-03-07

## 2017-03-06 RX ORDER — DEXTROSE 50 % IN WATER 50 %
12.5 SYRINGE (ML) INTRAVENOUS ONCE
Qty: 0 | Refills: 0 | Status: DISCONTINUED | OUTPATIENT
Start: 2017-03-06 | End: 2017-03-07

## 2017-03-06 RX ORDER — ALPRAZOLAM 0.25 MG
0.25 TABLET ORAL EVERY 6 HOURS
Qty: 0 | Refills: 0 | Status: DISCONTINUED | OUTPATIENT
Start: 2017-03-06 | End: 2017-03-07

## 2017-03-06 RX ORDER — ATORVASTATIN CALCIUM 80 MG/1
10 TABLET, FILM COATED ORAL AT BEDTIME
Qty: 0 | Refills: 0 | Status: DISCONTINUED | OUTPATIENT
Start: 2017-03-06 | End: 2017-03-07

## 2017-03-06 RX ORDER — INSULIN HUMAN 100 [IU]/ML
1 INJECTION, SOLUTION SUBCUTANEOUS
Qty: 0 | Refills: 0 | Status: DISCONTINUED | OUTPATIENT
Start: 2017-03-06 | End: 2017-03-07

## 2017-03-06 RX ORDER — GLUCAGON INJECTION, SOLUTION 0.5 MG/.1ML
1 INJECTION, SOLUTION SUBCUTANEOUS ONCE
Qty: 0 | Refills: 0 | Status: DISCONTINUED | OUTPATIENT
Start: 2017-03-06 | End: 2017-03-07

## 2017-03-06 RX ORDER — PANTOPRAZOLE SODIUM 20 MG/1
40 TABLET, DELAYED RELEASE ORAL
Qty: 0 | Refills: 0 | Status: DISCONTINUED | OUTPATIENT
Start: 2017-03-06 | End: 2017-03-07

## 2017-03-06 RX ORDER — SODIUM CHLORIDE 9 MG/ML
1000 INJECTION, SOLUTION INTRAVENOUS
Qty: 0 | Refills: 0 | Status: DISCONTINUED | OUTPATIENT
Start: 2017-03-06 | End: 2017-03-07

## 2017-03-06 RX ORDER — CEFAZOLIN SODIUM 1 G
2000 VIAL (EA) INJECTION EVERY 8 HOURS
Qty: 0 | Refills: 0 | Status: COMPLETED | OUTPATIENT
Start: 2017-03-06 | End: 2017-03-07

## 2017-03-06 RX ADMIN — Medication 100 MILLIGRAM(S): at 23:29

## 2017-03-06 RX ADMIN — ATORVASTATIN CALCIUM 10 MILLIGRAM(S): 80 TABLET, FILM COATED ORAL at 22:00

## 2017-03-06 NOTE — DISCHARGE NOTE ADULT - INSTRUCTIONS
With a diagnosis of diabetes comes a strict diet regimen to help control blood sugars by watching carbohydrate consumption. Carbohydrates, such as starches, fruits, dairy and starchy vegetables, is the food group that affects glucose levels in the body. Carefully monitoring carbohydrate intake is a main component of the diabetic diet; eating three meals each day that are roughly equivalent in size can help control blood sugars. A registered dietitian can help you plan a diet customized to your individual needs.  Choose lean meats and poultry without skin and prepare them without added saturated and trans fat.  Eat fish at least twice a week. Recent research shows that eating oily fish containing omega-3 fatty acids (for example, salmon, trout and herring) may help lower your risk of death from coronary artery disease.  Select fat-free, 1 percent fat and low-fat dairy products.  Cut back on foods containing partially hydrogenated vegetable oils to reduce trans fat in your diet.   To lower cholesterol, reduce saturated fat to no more than 5 to 6 percent of total calories. For someone eating 2,000 calories a day, that’s about 13 grams of saturated fat.  Cut back on beverages and foods with added sugars.  Choose and prepare foods with little or no salt. To lower blood pressure, aim to eat no more than 2,400 milligrams of sodium per day. Reducing daily intake to 1,500 mg is desirable because it can lower blood pressure even further.  If you drink alcohol, drink in moderation. That means one drink per day if you’re a woman and two drinks  per day if you’re a man.  Follow the American Heart Association recommendations when you eat out, and keep an eye on your portion sizes.

## 2017-03-06 NOTE — DISCHARGE NOTE ADULT - PROVIDER TOKENS
FREE:[LAST:[Olivia],FIRST:[Pratik],PHONE:[phone]],FAX:[phone]],ADDRESS:[[facility]  [address] [place] [address]]] FREE:[LAST:[Olivia],FIRST:[Pratik],PHONE:[phone]],FAX:[(   )    -],ADDRESS:[[address], [place] [address]]]

## 2017-03-06 NOTE — DISCHARGE NOTE ADULT - PLAN OF CARE
optimize cardiac health Cardiac Device Implant Post Operative Instructions  - Bruising around the implant site or over the chest, side or arm near the incision is normal, and will take a few weeks to resolve.  -Do not lift the affected arm higher than 90 degrees (shoulder height) in any direction for 4 weeks.   - Do not push, pull or lift anything heavier than 10 lbs (about a gallon of milk) with the affected arm for 4 weeks.     - Do not touch the incision until it is completely healed.   - There are Steristrips (white strips of tape) on your incision, which will start to peal off on their own over the next 2-3 weeks. Do not pick at or peal off the Steristrips.   - Do not apply soaps, creams, lotions, ointments or powders to the incision until it is completely healed.  You should call the doctor if:   - you notice redness, drainage, swelling, increased tenderness, hot sensation around the  incision, bleeding or incision edges pulling apart.  - your temperature is greater than 100 degress F for more than 24 hours.  - you notice swelling or bulging at the incision or around the device that was not there when you left the hospital or is increasing in size.  -you experience increased difficulty breathing.  - you notice new/worsening swelling in your legs and ankles.  - you faint or have dizzy spells.  -you have any questions or concerns regarding your device or the procedure.

## 2017-03-06 NOTE — DISCHARGE NOTE ADULT - HOSPITAL COURSE
57 year old gentleman with history of NYHA class III chronic systolic congestive heart failure, severe LV dysfunction with LVEF 20-25% despite optimal medical therapy, and repeated heart failure exacerbations requiring hospitalization. He also has persistent atrial fibrillation with relatively slow ventricular rates, and evidence of conduction system disease with bifascicular block on ECG. Implantation of a biventricular ICD was recommended for primary prevention of sudden death and cardiac resynchronization therapy. The patient presented electively 3/6/17 for BiV ICD implant,  which was performed without acute complication. 57 year old gentleman with history of NYHA class III chronic systolic congestive heart failure, severe LV dysfunction with LVEF 20-25% despite optimal medical therapy, and repeated heart failure exacerbations requiring hospitalization. He also has persistent atrial fibrillation with relatively slow ventricular rates, and evidence of conduction system disease with bifascicular block on ECG. Implantation of a biventricular ICD was recommended for primary prevention of sudden death and cardiac resynchronization therapy. The patient presented electively 3/6/17 for BiV ICD implant,  which was performed without acute complication. The patient was observed overnight without event and was discharged home the following morning with a plan for outpatient follow up.

## 2017-03-06 NOTE — DISCHARGE NOTE ADULT - PATIENT PORTAL LINK FT
“You can access the Follow[employer] Patient Portal, offered by [facility], by registering with the following website: [email or web address]”

## 2017-03-06 NOTE — DISCHARGE NOTE ADULT - CARE PLAN
Principal Discharge DX:	Chronic systolic congestive heart failure  Goal:	optimize cardiac health  Instructions for follow-up, activity and diet:	Cardiac Device Implant Post Operative Instructions  - Bruising around the implant site or over the chest, side or arm near the incision is normal, and will take a few weeks to resolve.  -Do not lift the affected arm higher than 90 degrees (shoulder height) in any direction for 4 weeks.   - Do not push, pull or lift anything heavier than 10 lbs (about a gallon of milk) with the affected arm for 4 weeks.     - Do not touch the incision until it is completely healed.   - There are Steristrips (white strips of tape) on your incision, which will start to peal off on their own over the next 2-3 weeks. Do not pick at or peal off the Steristrips.   - Do not apply soaps, creams, lotions, ointments or powders to the incision until it is completely healed.  You should call the doctor if:   - you notice redness, drainage, swelling, increased tenderness, hot sensation around the  incision, bleeding or incision edges pulling apart.  - your temperature is greater than 100 degress F for more than 24 hours.  - you notice swelling or bulging at the incision or around the device that was not there when you left the hospital or is increasing in size.  -you experience increased difficulty breathing.  - you notice new/worsening swelling in your legs and ankles.  - you faint or have dizzy spells.  -you have any questions or concerns regarding your device or the procedure.  Secondary Diagnosis:	Atrial fibrillation with slow ventricular response

## 2017-03-06 NOTE — DISCHARGE NOTE ADULT - CARE PROVIDERS DIRECT ADDRESSES
,DirectAddress_Unknown,[email or web address]

## 2017-03-06 NOTE — DISCHARGE NOTE ADULT - MEDICATION SUMMARY - MEDICATIONS TO TAKE
I will START or STAY ON the medications listed below when I get home from the hospital:    spironolactone 25 mg oral tablet  -- 1 tab(s) by mouth once a day  -- Indication: For Chronic systolic congestive heart failure    acetaminophen 325 mg oral tablet  -- 2 tab(s) by mouth every 6 hours, As needed, Mild Pain (1 - 3)  -- Indication: For As needed for post op pain    Vasotec 10 mg oral tablet  -- 1 tab(s) by mouth once a day  -- Indication: For hypertension (blood pressure) and heart failure management    Eliquis 2.5 mg oral tablet  -- 1 tab(s) by mouth 2 times a day  -- Check with your doctor before becoming pregnant.  It is very important that you take or use this exactly as directed.  Do not skip doses or discontinue unless directed by your doctor.  Obtain medical advice before taking any non-prescription drugs as some may affect the action of this medication.    -- Indication: For Atrial fibrillation     gabapentin 300 mg oral capsule  --  by mouth once a day  -- Indication: For neuropathic pain    HumuLIN R (Concentrated) 500 units/mL human recombinant subcutaneous solution  -- 1 unit(s) subcutaneous every hour  basal rate  insulin pump  -- Indication: For diabetes    gemfibrozil 600 mg oral tablet  -- 1 tab(s) by mouth 2 times a day  -- Indication: For Cholesterol management    pravastatin 40 mg oral tablet  -- 1 tab(s) by mouth once a day  -- Indication: For Cholesterol management    carvedilol 12.5 mg oral tablet  -- 1 tab(s) by mouth every 12 hours  -- Indication: For heart rate control and heart failure management    Lasix 20 mg oral tablet  -- 1 tab(s) by mouth once a day, As Needed based on daily weight  -- Indication: For hypertension (blood pressure) and heart failure management    tiZANidine 4 mg oral capsule  --  by mouth , As Needed  -- Indication: For muscle spasm    pantoprazole 40 mg oral delayed release tablet  -- 1 tab(s) by mouth once a day  -- Indication: For gastric reflux

## 2017-03-06 NOTE — DISCHARGE NOTE ADULT - MEDICATION SUMMARY - MEDICATIONS TO CHANGE
I will SWITCH the dose or number of times a day I take the medications listed below when I get home from the hospital:    carvedilol 6.25 mg oral tablet  -- 1 tab(s) by mouth 2 times a day

## 2017-03-06 NOTE — CHART NOTE - NSCHARTNOTEFT_GEN_A_CORE
per pt and wife repeat potassium post kayexalate was 5.1  last dose of Eliquis was 3/3/17 pm  npo since last night

## 2017-03-06 NOTE — DISCHARGE NOTE ADULT - ADDITIONAL INSTRUCTIONS
Follow up with [facility] (Pratik Baker MD) on 3/22/17 at 10:00am. Follow up with Dr. Baker in 2 weeks - please call to make appointment.

## 2017-03-06 NOTE — PROGRESS NOTE ADULT - SUBJECTIVE AND OBJECTIVE BOX
PROCEDURE(S): [employer] BiV ICD Implant    ELECTRPHYSIOLOGIST(S): Pratik Baker MD    COMPLICATIONS:  none          DISPOSITION:  Observation Unit           CONDITION: Stable      Pt doing well s/p BSx BiV ICD implant. Denies complaint    Exam:   HR: 70 (70 - 70)  BP: 106/75 (106/75 - 106/75)  RR: 16 (16 - 16)  SpO2: 96% (96% - 96%)  VSS, NAD, A&O x 3  Incision: Dressing C/D/I; no bleeding, hematoma, erythema or edema  Card: S1/S2, RRR, no m/g/r  Resp: lungs CTA b/l  Abd: S/NT/ND  Ext: no edema, distal pulses intact    Assessment:   57 year old gentleman with history of NYHA class III chronic systolic congestive heart failure, severe LV dysfunction with LVEF 20-25% despite optimal medical therapy, repeated heart failure exacerbations requiring hospitalization persistent atrial fibrillation with slow ventricular rates, and evidence of conduction system disease with bifascicular block on ECG. Now s/p BSx BiV ICD for primary prevention of SCD and CRT.     Plan:   Port CXR lead locations grossly appropriate/stable; no keshav PTX or effusion. Await official read  Bedrest x 4 hours post implant, then OOB w/ assist & progress as tolerated.    Continued observation on telemetry overnight.   Cont Ancef 1gm IV q 8 hours x 2 additional doses to complete 24 hour course.   Pain control with PO analgesia PRN.   NO HEPARIN OR LOVENOX, INCLUDING PROPHYLACTIC/SUBCUT DOSING, UNTIL OTHERWISE ADVISED BY AMRITA.   Robin to resume 3/8/17 PM. Notably - it is unclear why pt is on 2.5mg BID dose. However, this dose will be continued until Dr. Baker is able to clarify further with referring MD.   Increase Coreg to 12.5mg BID  Resume other home medications.   PA/Lat CXR and device check in AM.   Pending status overnight, anticipate d/c home tomorrow with outpt f/up in 1-2 weeks. PROCEDURE(S): [employer] BiV ICD Implant    ELECTRPHYSIOLOGIST(S): Pratik Baker MD    COMPLICATIONS:  none          DISPOSITION:  Observation Unit           CONDITION: Stable      Pt doing well s/p BSx BiV ICD implant. Denies complaint    Device:   Dionisio VVIR 70-130bpm  Tachy: VF = 200bpm (ATP during charge, 41Jx6)             VT = 170bpm (monitor only)    Exam:   HR: 70 (70 - 70)  BP: 106/75 (106/75 - 106/75)  RR: 16 (16 - 16)  SpO2: 96% (96% - 96%)  VSS, NAD, A&O x 3  Incision: Dressing C/D/I; no bleeding, hematoma, erythema or edema  Card: S1/S2, RRR, no m/g/r  Resp: lungs CTA b/l  Abd: S/NT/ND  Ext: no edema, distal pulses intact    Assessment:   57 year old gentleman with history of NYHA class III chronic systolic congestive heart failure, severe LV dysfunction with LVEF 20-25% despite optimal medical therapy, repeated heart failure exacerbations requiring hospitalization persistent atrial fibrillation with slow ventricular rates, and evidence of conduction system disease with bifascicular block on ECG. Now s/p BSx BiV ICD for primary prevention of SCD and CRT.     Plan:   Port CXR lead locations grossly appropriate/stable; no keshav PTX or effusion. Await official read  Insulin pump restarted - BG monitoring & dosing per protocol.   Bedrest x 4 hours post implant, then OOB w/ assist & progress as tolerated.    Continued observation on telemetry overnight.   Cont Ancef 1gm IV q 8 hours x 2 additional doses to complete 24 hour course.   Pain control with PO analgesia PRN.   NO HEPARIN OR LOVENOX, INCLUDING PROPHYLACTIC/SUBCUT DOSING, UNTIL OTHERWISE ADVISED BY EP.   Eliquis to resume 3/8/17 PM. Notably - it is unclear why pt is on 2.5mg BID dose. However, this dose will be continued until Dr. Baker is able to clarify further with referring MD.   Increase Coreg to 12.5mg BID  Resume other home medications.   PA/Lat CXR and device check in AM.   Pending status overnight, anticipate d/c home tomorrow with outpt f/up in 1-2 weeks.

## 2017-03-06 NOTE — DISCHARGE NOTE ADULT - CARE PROVIDER_API CALL
Pratik Baker  [facility]  [address] [place] [address]  Phone: [phone]  Fax: [phone] Pratik Baker  [facility]  [address] [place] [address], [id number]  [place] [address]  Phone: [phone]  Fax:     -

## 2017-03-07 VITALS — WEIGHT: 315 LBS

## 2017-03-07 LAB
ANION GAP SERPL CALC-SCNC: 12 MMOL/L — SIGNIFICANT CHANGE UP (ref 5–17)
BUN SERPL-MCNC: 73 MG/DL — HIGH (ref 8–20)
CALCIUM SERPL-MCNC: 8.8 MG/DL — SIGNIFICANT CHANGE UP (ref 8.6–10.2)
CHLORIDE SERPL-SCNC: 109 MMOL/L — HIGH (ref 98–107)
CO2 SERPL-SCNC: 16 MMOL/L — LOW (ref 22–29)
CREAT SERPL-MCNC: 2.1 MG/DL — HIGH (ref 0.5–1.3)
GLUCOSE SERPL-MCNC: 131 MG/DL — HIGH (ref 70–115)
HCT VFR BLD CALC: 40.1 % — LOW (ref 42–52)
HGB BLD-MCNC: 13.1 G/DL — LOW (ref 14–18)
MAGNESIUM SERPL-MCNC: 2 MG/DL — SIGNIFICANT CHANGE UP (ref 1.8–2.5)
MCHC RBC-ENTMCNC: 29 PG — SIGNIFICANT CHANGE UP (ref 27–31)
MCHC RBC-ENTMCNC: 32.7 G/DL — SIGNIFICANT CHANGE UP (ref 32–36)
MCV RBC AUTO: 88.7 FL — SIGNIFICANT CHANGE UP (ref 80–94)
PLATELET # BLD AUTO: 140 K/UL — LOW (ref 150–400)
POTASSIUM SERPL-MCNC: 5.1 MMOL/L — SIGNIFICANT CHANGE UP (ref 3.5–5.3)
POTASSIUM SERPL-SCNC: 5.1 MMOL/L — SIGNIFICANT CHANGE UP (ref 3.5–5.3)
RBC # BLD: 4.52 M/UL — LOW (ref 4.6–6.2)
RBC # FLD: 14.4 % — SIGNIFICANT CHANGE UP (ref 11–15.6)
SODIUM SERPL-SCNC: 137 MMOL/L — SIGNIFICANT CHANGE UP (ref 135–145)
WBC # BLD: 7.4 K/UL — SIGNIFICANT CHANGE UP (ref 4.8–10.8)
WBC # FLD AUTO: 7.4 K/UL — SIGNIFICANT CHANGE UP (ref 4.8–10.8)

## 2017-03-07 PROCEDURE — 71010: CPT | Mod: 26,59

## 2017-03-07 PROCEDURE — 93010 ELECTROCARDIOGRAM REPORT: CPT

## 2017-03-07 PROCEDURE — 71020: CPT | Mod: 26

## 2017-03-07 PROCEDURE — 99233 SBSQ HOSP IP/OBS HIGH 50: CPT

## 2017-03-07 RX ORDER — CARVEDILOL PHOSPHATE 80 MG/1
1 CAPSULE, EXTENDED RELEASE ORAL
Qty: 90 | Refills: 0 | OUTPATIENT
Start: 2017-03-07

## 2017-03-07 RX ORDER — ACETAMINOPHEN 500 MG
2 TABLET ORAL
Qty: 0 | Refills: 0 | COMMUNITY
Start: 2017-03-07

## 2017-03-07 RX ADMIN — Medication 10 MILLIGRAM(S): at 06:58

## 2017-03-07 RX ADMIN — Medication 600 MILLIGRAM(S): at 06:58

## 2017-03-07 RX ADMIN — Medication 100 MILLIGRAM(S): at 06:57

## 2017-03-07 RX ADMIN — SPIRONOLACTONE 25 MILLIGRAM(S): 25 TABLET, FILM COATED ORAL at 06:58

## 2017-03-07 RX ADMIN — CARVEDILOL PHOSPHATE 12.5 MILLIGRAM(S): 80 CAPSULE, EXTENDED RELEASE ORAL at 07:00

## 2017-03-07 RX ADMIN — PANTOPRAZOLE SODIUM 40 MILLIGRAM(S): 20 TABLET, DELAYED RELEASE ORAL at 06:58

## 2017-03-07 NOTE — PROGRESS NOTE ADULT - SUBJECTIVE AND OBJECTIVE BOX
Pt doing well POD #1 s/p [employer] BiV ICD implant. Stable overnight w/o event. Denies complaint.       Exam:   VSS, NAD, A&O x 3  Incision: Steri strips otherwise C/D/I; no bleeding, hematoma, erythema, exudate or edema; distal pulses intact  Card: S1/S2, irregular rhythm, no m/g/r  Resp: lungs CTA b/l  Abd: S/NT/ND  Ext: no edema, distal pulses intact    Device interrogation: measurements appropriate & stable. 56% BiV pacing. Parameters confirmed. No diaphragmatic stim.     Tele: AF w/ intermittent BiV pacing and intrinsic conduction; frequent PVCs; occasional runs NSVT - up to 9 beats, no sustained arrhythmias or acute changes.     CXR:     Labs:                         13.1   7.4   )-----------( 140      ( 07 Mar 2017 03:47 )             40.1        07 Mar 2017 03:47    137    |  109    |  73.0   ----------------------------<  131    5.1     |  16.0   |  2.10     Ca    8.8        07 Mar 2017 03:47  Mg     2.0       07 Mar 2017 03:47      Assessment:   57 year old gentleman with history of NYHA class III chronic systolic congestive heart failure, severe LV dysfunction with LVEF 20-25% despite optimal medical therapy, repeated heart failure exacerbations requiring hospitalization persistent atrial fibrillation with slow ventricular rates, and evidence of conduction system disease with bifascicular block on ECG. Now POD #1 s/p BSx BiV ICD for primary prevention of SCD and CRT.     Plan:   Pt instructed as to ROM of affected arm - no lifting/pushing/pulling >10 lbs & shoulder ROM limited to 90deg & below x 4 weeks.   Pt instructed as to incision care and f/up - written instructions included in d/c documents.  Outpt f/up in 2-3 weeks - pt will call to schedule.   Eliquis to resume 3/8 PM. 2.5mg dose suboptimal - Dr. Baker will d/w referring MD & clarify.    Pending CXR, anticipate no barriers to d/c from EP service perspective. Pt doing well POD #1 s/p [employer] BiV ICD implant. Stable overnight w/o event. Denies complaint.       Exam:   VSS, NAD, A&O x 3  Incision: Dermabond C/D/I; no bleeding, hematoma, erythema, exudate or edema; distal pulses intact  Card: S1/S2, irregular rhythm, no m/g/r  Resp: lungs CTA b/l  Abd: S/NT/ND  Ext: no edema, distal pulses intact    Device interrogation: measurements appropriate & stable. 56% BiV pacing. Parameters confirmed. No diaphragmatic stim.     Tele: AF w/ intermittent BiV pacing and intrinsic conduction; frequent PVCs; occasional runs NSVT - up to 9 beats, no sustained arrhythmias or acute changes.     EKG: AF w/ intact conduction, intermittent V pacing.     CXR: Pending    Labs:                         13.1   7.4   )-----------( 140      ( 07 Mar 2017 03:47 )             40.1        07 Mar 2017 03:47    137    |  109    |  73.0   ----------------------------<  131    5.1     |  16.0   |  2.10     Ca    8.8        07 Mar 2017 03:47  Mg     2.0       07 Mar 2017 03:47      Assessment:   57 year old gentleman with history of NYHA class III chronic systolic congestive heart failure, severe LV dysfunction with LVEF 20-25% despite optimal medical therapy, repeated heart failure exacerbations requiring hospitalization persistent atrial fibrillation with slow ventricular rates, and evidence of conduction system disease with bifascicular block on ECG. Now POD #1 s/p BSx BiV ICD for primary prevention of SCD and CRT.     Plan:   Pt instructed as to ROM of affected arm - no lifting/pushing/pulling >10 lbs & shoulder ROM limited to 90deg & below x 4 weeks.   Pt instructed as to incision care and f/up - written instructions included in d/c documents.  Outpt f/up in 2-3 weeks - pt will call to schedule.   Coreg increased to 12.5mg BID.   Eliquis to resume 3/8 PM. 2.5mg dose suboptimal - Dr. Baker will d/w referring MD & clarify.    Pending CXR, anticipate no barriers to d/c from EP service perspective.

## 2017-03-07 NOTE — PROGRESS NOTE ADULT - ATTENDING COMMENTS
Agree with above. PA and Lateral CXR with stable lead position. pt feels well without complaint.   OK for discharge, with plan for outpt followup in 2 weeks. Reinitiation of anticoagulation as above. Will titrate beta blockers as tolerated for asymptomatic NSVT for now.

## 2017-03-17 PROBLEM — I10 ESSENTIAL (PRIMARY) HYPERTENSION: Chronic | Status: ACTIVE | Noted: 2017-01-31

## 2017-03-22 ENCOUNTER — APPOINTMENT (OUTPATIENT)
Dept: ELECTROPHYSIOLOGY | Facility: CLINIC | Age: 57
End: 2017-03-22

## 2017-03-22 VITALS
SYSTOLIC BLOOD PRESSURE: 110 MMHG | BODY MASS INDEX: 41.75 KG/M2 | HEART RATE: 77 BPM | DIASTOLIC BLOOD PRESSURE: 74 MMHG | WEIGHT: 315 LBS | HEIGHT: 73 IN

## 2017-03-22 DIAGNOSIS — I48.1 PERSISTENT ATRIAL FIBRILLATION: ICD-10-CM

## 2017-05-02 ENCOUNTER — RESULT REVIEW (OUTPATIENT)
Age: 57
End: 2017-05-02

## 2017-05-02 ENCOUNTER — APPOINTMENT (OUTPATIENT)
Dept: BARIATRICS | Facility: CLINIC | Age: 57
End: 2017-05-02

## 2017-05-02 VITALS
DIASTOLIC BLOOD PRESSURE: 53 MMHG | SYSTOLIC BLOOD PRESSURE: 87 MMHG | HEART RATE: 74 BPM | HEIGHT: 73 IN | BODY MASS INDEX: 41.75 KG/M2 | WEIGHT: 315 LBS

## 2017-05-02 RX ORDER — CARVEDILOL 12.5 MG/1
12.5 TABLET, FILM COATED ORAL
Refills: 0 | Status: ACTIVE | COMMUNITY

## 2017-05-02 RX ORDER — SIMVASTATIN 40 MG/1
40 TABLET, FILM COATED ORAL
Refills: 0 | Status: ACTIVE | COMMUNITY

## 2017-05-02 RX ORDER — SPIRONOLACTONE 25 MG/1
25 TABLET ORAL DAILY
Qty: 30 | Refills: 3 | Status: COMPLETED | COMMUNITY
End: 2017-05-02

## 2017-05-10 ENCOUNTER — OUTPATIENT (OUTPATIENT)
Dept: OUTPATIENT SERVICES | Facility: HOSPITAL | Age: 57
LOS: 1 days | End: 2017-05-10
Payer: MEDICAID

## 2017-05-10 VITALS
OXYGEN SATURATION: 96 % | HEART RATE: 76 BPM | DIASTOLIC BLOOD PRESSURE: 69 MMHG | SYSTOLIC BLOOD PRESSURE: 106 MMHG | RESPIRATION RATE: 18 BRPM | TEMPERATURE: 98 F

## 2017-05-10 DIAGNOSIS — Z95.0 PRESENCE OF CARDIAC PACEMAKER: Chronic | ICD-10-CM

## 2017-05-10 DIAGNOSIS — Z01.810 ENCOUNTER FOR PREPROCEDURAL CARDIOVASCULAR EXAMINATION: ICD-10-CM

## 2017-05-10 LAB
ALBUMIN SERPL ELPH-MCNC: 4.3 G/DL — SIGNIFICANT CHANGE UP (ref 3.3–5.2)
ALP SERPL-CCNC: 49 U/L — SIGNIFICANT CHANGE UP (ref 40–120)
ALT FLD-CCNC: 7 U/L — SIGNIFICANT CHANGE UP
ANION GAP SERPL CALC-SCNC: 15 MMOL/L — SIGNIFICANT CHANGE UP (ref 5–17)
AST SERPL-CCNC: 13 U/L — SIGNIFICANT CHANGE UP
BILIRUB SERPL-MCNC: 0.3 MG/DL — LOW (ref 0.4–2)
BUN SERPL-MCNC: 46 MG/DL — HIGH (ref 8–20)
CALCIUM SERPL-MCNC: 9.3 MG/DL — SIGNIFICANT CHANGE UP (ref 8.6–10.2)
CHLORIDE SERPL-SCNC: 104 MMOL/L — SIGNIFICANT CHANGE UP (ref 98–107)
CO2 SERPL-SCNC: 23 MMOL/L — SIGNIFICANT CHANGE UP (ref 22–29)
CREAT SERPL-MCNC: 1.67 MG/DL — HIGH (ref 0.5–1.3)
GLUCOSE SERPL-MCNC: 132 MG/DL — HIGH (ref 70–115)
HCT VFR BLD CALC: 42.6 % — SIGNIFICANT CHANGE UP (ref 42–52)
HGB BLD-MCNC: 13.3 G/DL — LOW (ref 14–18)
INR BLD: 1.2 RATIO — HIGH (ref 0.88–1.16)
MCHC RBC-ENTMCNC: 28.3 PG — SIGNIFICANT CHANGE UP (ref 27–31)
MCHC RBC-ENTMCNC: 31.2 G/DL — LOW (ref 32–36)
MCV RBC AUTO: 90.6 FL — SIGNIFICANT CHANGE UP (ref 80–94)
PLATELET # BLD AUTO: 143 K/UL — LOW (ref 150–400)
POTASSIUM SERPL-MCNC: 4.8 MMOL/L — SIGNIFICANT CHANGE UP (ref 3.5–5.3)
POTASSIUM SERPL-SCNC: 4.8 MMOL/L — SIGNIFICANT CHANGE UP (ref 3.5–5.3)
PROT SERPL-MCNC: 8 G/DL — SIGNIFICANT CHANGE UP (ref 6.6–8.7)
PROTHROM AB SERPL-ACNC: 13.3 SEC — HIGH (ref 9.8–12.7)
RBC # BLD: 4.7 M/UL — SIGNIFICANT CHANGE UP (ref 4.6–6.2)
RBC # FLD: 14.3 % — SIGNIFICANT CHANGE UP (ref 11–15.6)
SODIUM SERPL-SCNC: 142 MMOL/L — SIGNIFICANT CHANGE UP (ref 135–145)
WBC # BLD: 6.3 K/UL — SIGNIFICANT CHANGE UP (ref 4.8–10.8)
WBC # FLD AUTO: 6.3 K/UL — SIGNIFICANT CHANGE UP (ref 4.8–10.8)

## 2017-05-10 PROCEDURE — 93005 ELECTROCARDIOGRAM TRACING: CPT

## 2017-05-10 PROCEDURE — 80053 COMPREHEN METABOLIC PANEL: CPT

## 2017-05-10 PROCEDURE — 85027 COMPLETE CBC AUTOMATED: CPT

## 2017-05-10 PROCEDURE — G0463: CPT

## 2017-05-10 PROCEDURE — 93010 ELECTROCARDIOGRAM REPORT: CPT

## 2017-05-10 PROCEDURE — 85610 PROTHROMBIN TIME: CPT

## 2017-05-10 RX ORDER — SPIRONOLACTONE 25 MG/1
1 TABLET, FILM COATED ORAL
Qty: 0 | Refills: 0 | COMMUNITY

## 2017-05-10 NOTE — H&P PST ADULT - FAMILY HISTORY
Father  Still living? Unknown  Family history of stroke, Age at diagnosis: Age Unknown  Family history of heart disease, Age at diagnosis: Age Unknown  Family history of CHF (congestive heart failure), Age at diagnosis: Age Unknown

## 2017-05-10 NOTE — H&P PST ADULT - HISTORY OF PRESENT ILLNESS
Patient is a 57 year old male who presents for PST prior to a ALVARO/CV. Patient with Pmhx of CHF, Afib, HTN, MIKE (on bipap), obesity. History dates back to 10 years ago when his EF was 41% but TTE in dec 2016 show an EF of 20-25%. S/P CRT-D implantation in March 2017. He presents for cardioversion because he has had persistent symptoms with ongoing functional limitations related to his Afib.     Cardiologist Dr. Oakley Patient is a 57 year old male who presents for PST prior to a ALVARO/CV. Patient with Pmhx of CHF, Afib, HTN, MIKE (on bipap), obesity. History dates back to 10 years ago when his EF was 41% but TTE in dec 2016 show an EF of 20-25%. S/P CRT-D implantation in March 2017. He presents for cardioversion because he has had persistent symptoms with ongoing functional limitations related to his Afib.       Patient with an insulin pump who will manage on his own that morning.       Cardiologist Dr. Oakley Patient is a 57 year old male who presents for PST prior to a ALVARO/CV. Patient with Pmhx of CHF, Afib, HTN, MIKE (on bipap), obesity. History dates back to 10 years ago when his EF was 41% but TTE in dec 2016 show an EF of 20-25%. S/P CRT-D implantation in March 2017. He presents for cardioversion because he has had persistent symptoms with ongoing functional limitations related to his Afib.       Patient with an insulin pump who will manage on his own that morning. Patient with elevated creatinine. Trending down. Should be repeated on day of procedure.       Cardiologist Dr. Oakley

## 2017-05-15 ENCOUNTER — TRANSCRIPTION ENCOUNTER (OUTPATIENT)
Age: 57
End: 2017-05-15

## 2017-05-15 ENCOUNTER — OUTPATIENT (OUTPATIENT)
Dept: OUTPATIENT SERVICES | Facility: HOSPITAL | Age: 57
LOS: 1 days | Discharge: HOME | End: 2017-05-15
Payer: MEDICAID

## 2017-05-15 VITALS — WEIGHT: 315 LBS

## 2017-05-15 DIAGNOSIS — Z95.0 PRESENCE OF CARDIAC PACEMAKER: Chronic | ICD-10-CM

## 2017-05-15 DIAGNOSIS — I48.1 PERSISTENT ATRIAL FIBRILLATION: ICD-10-CM

## 2017-05-15 PROCEDURE — 93320 DOPPLER ECHO COMPLETE: CPT | Mod: 26

## 2017-05-15 PROCEDURE — 93325 DOPPLER ECHO COLOR FLOW MAPG: CPT

## 2017-05-15 PROCEDURE — 76376 3D RENDER W/INTRP POSTPROCES: CPT | Mod: 26

## 2017-05-15 PROCEDURE — 93312 ECHO TRANSESOPHAGEAL: CPT | Mod: 26

## 2017-05-15 PROCEDURE — 93325 DOPPLER ECHO COLOR FLOW MAPG: CPT | Mod: 26

## 2017-05-15 PROCEDURE — 92960 CARDIOVERSION ELECTRIC EXT: CPT

## 2017-05-15 PROCEDURE — 93010 ELECTROCARDIOGRAM REPORT: CPT

## 2017-05-15 PROCEDURE — 93312 ECHO TRANSESOPHAGEAL: CPT

## 2017-05-15 PROCEDURE — 93320 DOPPLER ECHO COMPLETE: CPT

## 2017-05-15 PROCEDURE — 93005 ELECTROCARDIOGRAM TRACING: CPT

## 2017-05-15 RX ORDER — SODIUM CHLORIDE 9 MG/ML
1000 INJECTION INTRAMUSCULAR; INTRAVENOUS; SUBCUTANEOUS
Qty: 0 | Refills: 0 | Status: DISCONTINUED | OUTPATIENT
Start: 2017-05-15 | End: 2017-05-30

## 2017-05-15 RX ORDER — GABAPENTIN 400 MG/1
0 CAPSULE ORAL
Qty: 0 | Refills: 0 | COMMUNITY

## 2017-05-15 NOTE — DISCHARGE NOTE ADULT - CARE PLAN
Principal Discharge DX:	Chronic atrial fibrillation  Goal:	NSR  Instructions for follow-up, activity and diet:	Do not eat before 1200 noon  Do not

## 2017-05-15 NOTE — DISCHARGE NOTE ADULT - HOSPITAL COURSE
S/P successful DCCV with 250 joules.  NSR confirmed with 12 lead EKG.  [employer] device interrogated set DDI rate 70.  Post procedure teaching done.  Patient verbalizes understanding.  Follow up with cardiology as outpatient in 2-3 weeks. S/P successful DCCV with 250 joules.  NSR confirmed with 12 lead EKG.  [employer] device interrogated set DDI rate 70.  NPO until 1200 noon may discharge at 1230 if stable.  Post procedure teaching done.  Patient verbalizes understanding.  Follow up with cardiology as outpatient in 2-3 weeks.

## 2017-05-15 NOTE — DISCHARGE NOTE ADULT - CARE PROVIDER_API CALL
Kenzie Jennings), Cardiology; Internal Medicine  [address] [place] [address]  Phone: [phone]  Fax: [phone]

## 2017-05-15 NOTE — DISCHARGE NOTE ADULT - PATIENT PORTAL LINK FT
“You can access the Follow[facility] Patient Portal, offered by [facility], by registering with the following website: [email or web address]”

## 2017-05-15 NOTE — PROGRESS NOTE ADULT - SUBJECTIVE AND OBJECTIVE BOX
TRANSESOPHAGEAL ECHOCARDIOGRAM (Prelim Note)    After risks and benefits of procedures were explained informed consent was obtained and placed in chart.   ALVARO was performed.  Anesthesia present to administer sedation.  Patient tolerated the procedure well without complication.      Findings:  No cardiac mass, vegetations, thrombus or shunts visualized.   LA =  very  Dilated. NOEMY: No thrombus. decrease emptying velocities.  No PFO.  LV= enlarged size and function.  LV ejection fraction: 50-55%  RA= Very dilated.  RV:  Mildly enlarged and function.   Mitral valve: Mild  regurgitation.     Aortic :  Trivial regurgitation.   Tricuspid Valve: Mild tricuspid regurgitation. Pulmonary artery systolic pressure = normal    No significant valvular abnormality.  No pericardial effusion.   Atherosclerosis of aorta:   Mild atherosclerosis of arch and descending aorta.   Patient successfully converted to sinus rhythm with 250  J of Direct current cardioversion.  Full report to follow.    Kenzie Jennings MD, FACC, DOUGLAS  [facility]  [facility]

## 2017-05-15 NOTE — PROCEDURE NOTE - ADDITIONAL PROCEDURE DETAILS
After risks and benefits of procedures were explained informed consent was obtained and placed in chart.  Anesthesia was present and administered sedation.  Patient then received synchronized cardioversion x1 with 250 J.  Patient converted to sinus rhythm, confirmed by 12-lead EKG.  Patient tolerated the procedure well without complication.

## 2017-05-15 NOTE — DISCHARGE NOTE ADULT - MEDICATION SUMMARY - MEDICATIONS TO TAKE
I will START or STAY ON the medications listed below when I get home from the hospital:    acetaminophen 325 mg oral tablet  -- 2 tab(s) by mouth every 6 hours, As needed, Mild Pain (1 - 3)  -- Indication: For Pain    enalapril 5 mg oral tablet  -- 1.5 tab(s) by mouth once a day  -- total 7.5mg    -- Indication: For HTN    enalapril 5 mg oral tablet  -- 1 tab(s) by mouth once a day  -- total 7.5 mg  -- Indication: For HTN    Eliquis 5 mg oral tablet  -- 1 tab(s) by mouth 2 times a day  -- Indication: For Persistent atrial fibrillation    gabapentin 300 mg oral capsule  --  by mouth 2 times a day  -- Indication: For Persistent atrial fibrillation    HumuLIN R (Concentrated) 500 units/mL human recombinant subcutaneous solution  -- 1 unit(s) subcutaneous every hour  basal rate  insulin pump  -- Indication: For DM    gemfibrozil 600 mg oral tablet  -- 1 tab(s) by mouth 2 times a day  -- Indication: For HLD    simvastatin 40 mg oral tablet  -- 1 tab(s) by mouth once a day (at bedtime)  -- Indication: For HLD    carvedilol 12.5 mg oral tablet  -- 1 tab(s) by mouth every 12 hours  -- Indication: For Persistent atrial fibrillation    Lasix 20 mg oral tablet  -- 1 tab(s) by mouth once a day, As Needed based on daily weight  -- Indication: For Persistent atrial fibrillation    tiZANidine 4 mg oral capsule  --  by mouth , As Needed  -- Indication: For Muscle relaxant    pantoprazole 40 mg oral delayed release tablet  -- 1 tab(s) by mouth once a day  -- Indication: For GERD

## 2017-05-15 NOTE — DISCHARGE NOTE ADULT - NS AS ACTIVITY OBS
Walking-Outdoors allowed/Walking-Indoors allowed/Sex allowed/Return to Work/School allowed/Stairs allowed/No Heavy lifting/straining/Showering allowed/Bathing allowed/Do not make important decisions/Do not drive or operate machinery

## 2017-06-20 ENCOUNTER — APPOINTMENT (OUTPATIENT)
Dept: BARIATRICS/WEIGHT MGMT | Facility: CLINIC | Age: 57
End: 2017-06-20

## 2017-06-21 VITALS — HEIGHT: 73 IN | BODY MASS INDEX: 41.75 KG/M2 | WEIGHT: 315 LBS

## 2017-06-26 ENCOUNTER — APPOINTMENT (OUTPATIENT)
Dept: BARIATRICS/WEIGHT MGMT | Facility: CLINIC | Age: 57
End: 2017-06-26

## 2017-06-26 VITALS — BODY MASS INDEX: 49.34 KG/M2 | WEIGHT: 315 LBS

## 2017-07-23 PROCEDURE — 80053 COMPREHEN METABOLIC PANEL: CPT

## 2017-07-23 PROCEDURE — 71046 X-RAY EXAM CHEST 2 VIEWS: CPT

## 2017-07-23 PROCEDURE — 85027 COMPLETE CBC AUTOMATED: CPT

## 2017-07-23 PROCEDURE — 80048 BASIC METABOLIC PNL TOTAL CA: CPT

## 2017-07-23 PROCEDURE — C1883: CPT

## 2017-07-23 PROCEDURE — 83036 HEMOGLOBIN GLYCOSYLATED A1C: CPT

## 2017-07-23 PROCEDURE — 71045 X-RAY EXAM CHEST 1 VIEW: CPT

## 2017-07-23 PROCEDURE — 84484 ASSAY OF TROPONIN QUANT: CPT

## 2017-07-23 PROCEDURE — C1882: CPT

## 2017-07-23 PROCEDURE — 99285 EMERGENCY DEPT VISIT HI MDM: CPT | Mod: 25

## 2017-07-23 PROCEDURE — C1730: CPT

## 2017-07-23 PROCEDURE — 83735 ASSAY OF MAGNESIUM: CPT

## 2017-07-23 PROCEDURE — 83880 ASSAY OF NATRIURETIC PEPTIDE: CPT

## 2017-07-23 PROCEDURE — 85730 THROMBOPLASTIN TIME PARTIAL: CPT

## 2017-07-23 PROCEDURE — 86901 BLOOD TYPING SEROLOGIC RH(D): CPT

## 2017-07-23 PROCEDURE — C1887: CPT

## 2017-07-23 PROCEDURE — 86850 RBC ANTIBODY SCREEN: CPT

## 2017-07-23 PROCEDURE — C1894: CPT

## 2017-07-23 PROCEDURE — 85610 PROTHROMBIN TIME: CPT

## 2017-07-23 PROCEDURE — 33225 L VENTRIC PACING LEAD ADD-ON: CPT

## 2017-07-23 PROCEDURE — 33249 INSJ/RPLCMT DEFIB W/LEAD(S): CPT

## 2017-07-23 PROCEDURE — 86900 BLOOD TYPING SEROLOGIC ABO: CPT

## 2017-07-23 PROCEDURE — 93005 ELECTROCARDIOGRAM TRACING: CPT

## 2017-07-23 PROCEDURE — 80162 ASSAY OF DIGOXIN TOTAL: CPT

## 2017-07-23 PROCEDURE — C1900: CPT

## 2017-07-23 PROCEDURE — C1769: CPT

## 2017-07-23 PROCEDURE — 94660 CPAP INITIATION&MGMT: CPT

## 2017-08-22 ENCOUNTER — APPOINTMENT (OUTPATIENT)
Dept: CARDIOLOGY | Facility: CLINIC | Age: 57
End: 2017-08-22
Payer: MEDICAID

## 2017-08-22 ENCOUNTER — APPOINTMENT (OUTPATIENT)
Dept: BARIATRICS | Facility: CLINIC | Age: 57
End: 2017-08-22
Payer: MEDICAID

## 2017-08-22 VITALS
DIASTOLIC BLOOD PRESSURE: 60 MMHG | WEIGHT: 315 LBS | RESPIRATION RATE: 16 BRPM | OXYGEN SATURATION: 98 % | HEART RATE: 83 BPM | SYSTOLIC BLOOD PRESSURE: 118 MMHG | BODY MASS INDEX: 40.43 KG/M2 | HEIGHT: 74 IN

## 2017-08-22 VITALS
DIASTOLIC BLOOD PRESSURE: 69 MMHG | WEIGHT: 315 LBS | HEART RATE: 90 BPM | BODY MASS INDEX: 41.75 KG/M2 | HEIGHT: 73 IN | SYSTOLIC BLOOD PRESSURE: 110 MMHG

## 2017-08-22 DIAGNOSIS — I50.9 HEART FAILURE, UNSPECIFIED: ICD-10-CM

## 2017-08-22 PROCEDURE — 99214 OFFICE O/P EST MOD 30 MIN: CPT

## 2017-08-22 RX ORDER — AMMONIUM LACTATE 12 %
12 CREAM (GRAM) TOPICAL TWICE DAILY
Qty: 1 | Refills: 5 | Status: ACTIVE | COMMUNITY
Start: 2017-08-22 | End: 1900-01-01

## 2017-08-22 RX ORDER — ENALAPRIL MALEATE 2.5 MG/1
2.5 TABLET ORAL
Refills: 0 | Status: COMPLETED | COMMUNITY
End: 2017-08-22

## 2017-09-19 ENCOUNTER — APPOINTMENT (OUTPATIENT)
Dept: CARDIOLOGY | Facility: CLINIC | Age: 57
End: 2017-09-19
Payer: MEDICAID

## 2017-09-19 VITALS
RESPIRATION RATE: 16 BRPM | OXYGEN SATURATION: 98 % | WEIGHT: 315 LBS | HEIGHT: 74 IN | SYSTOLIC BLOOD PRESSURE: 120 MMHG | DIASTOLIC BLOOD PRESSURE: 80 MMHG | BODY MASS INDEX: 40.43 KG/M2 | HEART RATE: 80 BPM

## 2017-09-19 PROCEDURE — 99214 OFFICE O/P EST MOD 30 MIN: CPT

## 2017-09-26 ENCOUNTER — APPOINTMENT (OUTPATIENT)
Dept: BARIATRICS | Facility: CLINIC | Age: 57
End: 2017-09-26
Payer: MEDICAID

## 2017-09-26 ENCOUNTER — CLINICAL ADVICE (OUTPATIENT)
Age: 57
End: 2017-09-26

## 2017-09-26 ENCOUNTER — APPOINTMENT (OUTPATIENT)
Dept: CARDIOLOGY | Facility: CLINIC | Age: 57
End: 2017-09-26
Payer: MEDICAID

## 2017-09-26 VITALS
BODY MASS INDEX: 47.84 KG/M2 | OXYGEN SATURATION: 97 % | RESPIRATION RATE: 16 BRPM | HEART RATE: 70 BPM | HEIGHT: 74 IN | SYSTOLIC BLOOD PRESSURE: 110 MMHG | DIASTOLIC BLOOD PRESSURE: 72 MMHG

## 2017-09-26 VITALS
HEIGHT: 74 IN | WEIGHT: 315 LBS | DIASTOLIC BLOOD PRESSURE: 80 MMHG | SYSTOLIC BLOOD PRESSURE: 120 MMHG | BODY MASS INDEX: 40.43 KG/M2

## 2017-09-26 DIAGNOSIS — Z98.84 BARIATRIC SURGERY STATUS: ICD-10-CM

## 2017-09-26 PROCEDURE — 99214 OFFICE O/P EST MOD 30 MIN: CPT

## 2017-10-03 ENCOUNTER — APPOINTMENT (OUTPATIENT)
Dept: BARIATRICS/WEIGHT MGMT | Facility: CLINIC | Age: 57
End: 2017-10-03

## 2017-10-10 ENCOUNTER — APPOINTMENT (OUTPATIENT)
Dept: CARDIOLOGY | Facility: CLINIC | Age: 57
End: 2017-10-10
Payer: MEDICAID

## 2017-10-10 VITALS
OXYGEN SATURATION: 98 % | BODY MASS INDEX: 40.43 KG/M2 | WEIGHT: 315 LBS | RESPIRATION RATE: 16 BRPM | HEART RATE: 74 BPM | DIASTOLIC BLOOD PRESSURE: 68 MMHG | HEIGHT: 74 IN | SYSTOLIC BLOOD PRESSURE: 116 MMHG

## 2017-10-10 PROCEDURE — 99214 OFFICE O/P EST MOD 30 MIN: CPT

## 2017-11-07 ENCOUNTER — APPOINTMENT (OUTPATIENT)
Dept: BARIATRICS/WEIGHT MGMT | Facility: CLINIC | Age: 57
End: 2017-11-07

## 2017-11-14 ENCOUNTER — APPOINTMENT (OUTPATIENT)
Dept: BARIATRICS | Facility: CLINIC | Age: 57
End: 2017-11-14
Payer: MEDICAID

## 2017-11-14 ENCOUNTER — APPOINTMENT (OUTPATIENT)
Dept: CARDIOLOGY | Facility: CLINIC | Age: 57
End: 2017-11-14

## 2017-11-14 VITALS
SYSTOLIC BLOOD PRESSURE: 120 MMHG | HEART RATE: 81 BPM | WEIGHT: 315 LBS | HEIGHT: 74 IN | OXYGEN SATURATION: 97 % | BODY MASS INDEX: 40.43 KG/M2 | DIASTOLIC BLOOD PRESSURE: 70 MMHG

## 2017-11-14 DIAGNOSIS — I10 ESSENTIAL (PRIMARY) HYPERTENSION: ICD-10-CM

## 2017-11-14 PROCEDURE — 99214 OFFICE O/P EST MOD 30 MIN: CPT

## 2017-12-19 ENCOUNTER — APPOINTMENT (OUTPATIENT)
Dept: BARIATRICS/WEIGHT MGMT | Facility: CLINIC | Age: 57
End: 2017-12-19
Payer: COMMERCIAL

## 2017-12-19 ENCOUNTER — APPOINTMENT (OUTPATIENT)
Dept: BARIATRICS | Facility: CLINIC | Age: 57
End: 2017-12-19
Payer: MEDICAID

## 2017-12-19 ENCOUNTER — APPOINTMENT (OUTPATIENT)
Dept: BARIATRICS/WEIGHT MGMT | Facility: CLINIC | Age: 57
End: 2017-12-19
Payer: SELF-PAY

## 2017-12-19 VITALS
OXYGEN SATURATION: 94 % | BODY MASS INDEX: 40.43 KG/M2 | HEART RATE: 85 BPM | HEIGHT: 74 IN | SYSTOLIC BLOOD PRESSURE: 120 MMHG | DIASTOLIC BLOOD PRESSURE: 80 MMHG | WEIGHT: 315 LBS

## 2017-12-19 VITALS — HEIGHT: 74 IN | BODY MASS INDEX: 40.43 KG/M2 | WEIGHT: 315 LBS

## 2017-12-19 PROCEDURE — 90832 PSYTX W PT 30 MINUTES: CPT

## 2017-12-19 PROCEDURE — 97803 MED NUTRITION INDIV SUBSEQ: CPT

## 2017-12-19 PROCEDURE — 99213 OFFICE O/P EST LOW 20 MIN: CPT

## 2017-12-19 RX ORDER — FUROSEMIDE 20 MG/1
20 TABLET ORAL
Refills: 0 | Status: DISCONTINUED | COMMUNITY
End: 2017-12-19

## 2017-12-19 RX ORDER — TIZANIDINE 4 MG/1
4 TABLET ORAL
Refills: 0 | Status: ACTIVE | COMMUNITY

## 2017-12-19 RX ORDER — APIXABAN 5 MG/1
5 TABLET, FILM COATED ORAL
Refills: 0 | Status: ACTIVE | COMMUNITY

## 2018-02-06 ENCOUNTER — APPOINTMENT (OUTPATIENT)
Dept: BARIATRICS | Facility: CLINIC | Age: 58
End: 2018-02-06
Payer: MEDICAID

## 2018-02-06 VITALS
OXYGEN SATURATION: 97 % | DIASTOLIC BLOOD PRESSURE: 68 MMHG | HEART RATE: 93 BPM | SYSTOLIC BLOOD PRESSURE: 110 MMHG | HEIGHT: 74 IN | BODY MASS INDEX: 40.43 KG/M2 | WEIGHT: 315 LBS

## 2018-02-06 DIAGNOSIS — K76.0 FATTY (CHANGE OF) LIVER, NOT ELSEWHERE CLASSIFIED: ICD-10-CM

## 2018-02-06 DIAGNOSIS — L97.921 NON-PRESSURE CHRONIC ULCER OF UNSPECIFIED PART OF LEFT LOWER LEG LIMITED TO BREAKDOWN OF SKIN: ICD-10-CM

## 2018-02-06 DIAGNOSIS — E11.9 TYPE 2 DIABETES MELLITUS W/OUT COMPLICATIONS: ICD-10-CM

## 2018-02-06 PROCEDURE — 99214 OFFICE O/P EST MOD 30 MIN: CPT

## 2018-02-06 RX ORDER — INSULIN HUMAN 500 [IU]/ML
INJECTION, SOLUTION SUBCUTANEOUS
Refills: 0 | Status: ACTIVE | COMMUNITY

## 2018-02-07 ENCOUNTER — OUTPATIENT (OUTPATIENT)
Dept: OUTPATIENT SERVICES | Facility: HOSPITAL | Age: 58
LOS: 1 days | End: 2018-02-07
Payer: MEDICAID

## 2018-02-07 DIAGNOSIS — E66.01 MORBID (SEVERE) OBESITY DUE TO EXCESS CALORIES: ICD-10-CM

## 2018-02-07 DIAGNOSIS — Z95.0 PRESENCE OF CARDIAC PACEMAKER: Chronic | ICD-10-CM

## 2018-02-07 DIAGNOSIS — K44.9 DIAPHRAGMATIC HERNIA WITHOUT OBSTRUCTION OR GANGRENE: ICD-10-CM

## 2018-02-07 PROCEDURE — 74241: CPT | Mod: 26

## 2018-02-07 PROCEDURE — 93970 EXTREMITY STUDY: CPT

## 2018-02-07 PROCEDURE — 74241: CPT

## 2018-02-07 PROCEDURE — 93970 EXTREMITY STUDY: CPT | Mod: 26

## 2018-02-22 ENCOUNTER — OUTPATIENT (OUTPATIENT)
Dept: OUTPATIENT SERVICES | Facility: HOSPITAL | Age: 58
LOS: 1 days | End: 2018-02-22
Payer: MEDICAID

## 2018-02-22 VITALS
HEIGHT: 75 IN | SYSTOLIC BLOOD PRESSURE: 104 MMHG | TEMPERATURE: 99 F | DIASTOLIC BLOOD PRESSURE: 69 MMHG | WEIGHT: 315 LBS | HEART RATE: 71 BPM | RESPIRATION RATE: 16 BRPM | OXYGEN SATURATION: 96 %

## 2018-02-22 DIAGNOSIS — Z95.0 PRESENCE OF CARDIAC PACEMAKER: Chronic | ICD-10-CM

## 2018-02-22 DIAGNOSIS — G47.33 OBSTRUCTIVE SLEEP APNEA (ADULT) (PEDIATRIC): ICD-10-CM

## 2018-02-22 DIAGNOSIS — E66.01 MORBID (SEVERE) OBESITY DUE TO EXCESS CALORIES: ICD-10-CM

## 2018-02-22 DIAGNOSIS — E11.9 TYPE 2 DIABETES MELLITUS WITHOUT COMPLICATIONS: ICD-10-CM

## 2018-02-22 DIAGNOSIS — I48.0 PAROXYSMAL ATRIAL FIBRILLATION: ICD-10-CM

## 2018-02-22 DIAGNOSIS — Z95.810 PRESENCE OF AUTOMATIC (IMPLANTABLE) CARDIAC DEFIBRILLATOR: Chronic | ICD-10-CM

## 2018-02-22 DIAGNOSIS — Z01.818 ENCOUNTER FOR OTHER PREPROCEDURAL EXAMINATION: ICD-10-CM

## 2018-02-22 DIAGNOSIS — I50.9 HEART FAILURE, UNSPECIFIED: ICD-10-CM

## 2018-02-22 DIAGNOSIS — Z87.2 PERSONAL HISTORY OF DISEASES OF THE SKIN AND SUBCUTANEOUS TISSUE: Chronic | ICD-10-CM

## 2018-02-22 DIAGNOSIS — Z95.810 PRESENCE OF AUTOMATIC (IMPLANTABLE) CARDIAC DEFIBRILLATOR: ICD-10-CM

## 2018-02-22 DIAGNOSIS — I10 ESSENTIAL (PRIMARY) HYPERTENSION: ICD-10-CM

## 2018-02-22 LAB
ALBUMIN SERPL ELPH-MCNC: 4.2 G/DL — SIGNIFICANT CHANGE UP (ref 3.3–5)
ALP SERPL-CCNC: 46 U/L — SIGNIFICANT CHANGE UP (ref 40–120)
ALT FLD-CCNC: 9 U/L — LOW (ref 10–45)
ANION GAP SERPL CALC-SCNC: 19 MMOL/L — HIGH (ref 5–17)
AST SERPL-CCNC: 23 U/L — SIGNIFICANT CHANGE UP (ref 10–40)
BILIRUB SERPL-MCNC: 0.4 MG/DL — SIGNIFICANT CHANGE UP (ref 0.2–1.2)
BLD GP AB SCN SERPL QL: NEGATIVE — SIGNIFICANT CHANGE UP
BUN SERPL-MCNC: 79 MG/DL — HIGH (ref 7–23)
CALCIUM SERPL-MCNC: 9.4 MG/DL — SIGNIFICANT CHANGE UP (ref 8.4–10.5)
CHLORIDE SERPL-SCNC: 100 MMOL/L — SIGNIFICANT CHANGE UP (ref 96–108)
CO2 SERPL-SCNC: 20 MMOL/L — LOW (ref 22–31)
CREAT SERPL-MCNC: 2.69 MG/DL — HIGH (ref 0.5–1.3)
GLUCOSE SERPL-MCNC: 74 MG/DL — SIGNIFICANT CHANGE UP (ref 70–99)
HBA1C BLD-MCNC: 7.5 % — HIGH (ref 4–5.6)
HCT VFR BLD CALC: 41.5 % — SIGNIFICANT CHANGE UP (ref 39–50)
HCV AB S/CO SERPL IA: 0.32 S/CO — SIGNIFICANT CHANGE UP
HCV AB SERPL-IMP: SIGNIFICANT CHANGE UP
HGB BLD-MCNC: 13.4 G/DL — SIGNIFICANT CHANGE UP (ref 13–17)
HIV 1+2 AB+HIV1 P24 AG SERPL QL IA: SIGNIFICANT CHANGE UP
MCHC RBC-ENTMCNC: 29.1 PG — SIGNIFICANT CHANGE UP (ref 27–34)
MCHC RBC-ENTMCNC: 32.3 GM/DL — SIGNIFICANT CHANGE UP (ref 32–36)
MCV RBC AUTO: 90 FL — SIGNIFICANT CHANGE UP (ref 80–100)
PLATELET # BLD AUTO: 157 K/UL — SIGNIFICANT CHANGE UP (ref 150–400)
POTASSIUM SERPL-MCNC: 4.4 MMOL/L — SIGNIFICANT CHANGE UP (ref 3.5–5.3)
POTASSIUM SERPL-SCNC: 4.4 MMOL/L — SIGNIFICANT CHANGE UP (ref 3.5–5.3)
PROT SERPL-MCNC: 7.9 G/DL — SIGNIFICANT CHANGE UP (ref 6–8.3)
RBC # BLD: 4.61 M/UL — SIGNIFICANT CHANGE UP (ref 4.2–5.8)
RBC # FLD: 14.9 % — HIGH (ref 10.3–14.5)
RH IG SCN BLD-IMP: POSITIVE — SIGNIFICANT CHANGE UP
SODIUM SERPL-SCNC: 139 MMOL/L — SIGNIFICANT CHANGE UP (ref 135–145)
WBC # BLD: 7.97 K/UL — SIGNIFICANT CHANGE UP (ref 3.8–10.5)
WBC # FLD AUTO: 7.97 K/UL — SIGNIFICANT CHANGE UP (ref 3.8–10.5)

## 2018-02-22 PROCEDURE — 86803 HEPATITIS C AB TEST: CPT

## 2018-02-22 PROCEDURE — 80053 COMPREHEN METABOLIC PANEL: CPT

## 2018-02-22 PROCEDURE — 86900 BLOOD TYPING SEROLOGIC ABO: CPT

## 2018-02-22 PROCEDURE — 83036 HEMOGLOBIN GLYCOSYLATED A1C: CPT

## 2018-02-22 PROCEDURE — G0463: CPT

## 2018-02-22 PROCEDURE — 85027 COMPLETE CBC AUTOMATED: CPT

## 2018-02-22 PROCEDURE — 87389 HIV-1 AG W/HIV-1&-2 AB AG IA: CPT

## 2018-02-22 PROCEDURE — 86901 BLOOD TYPING SEROLOGIC RH(D): CPT

## 2018-02-22 PROCEDURE — 86850 RBC ANTIBODY SCREEN: CPT

## 2018-02-22 RX ORDER — FUROSEMIDE 40 MG
1 TABLET ORAL
Qty: 0 | Refills: 0 | COMMUNITY

## 2018-02-22 RX ORDER — GEMFIBROZIL 600 MG
1 TABLET ORAL
Qty: 0 | Refills: 0 | COMMUNITY

## 2018-02-22 NOTE — H&P PST ADULT - MUSCULOSKELETAL
No joint pain, swelling or deformity; no limitation of movement negative details… detailed exam decreased ROM/decreased ROM due to pain

## 2018-02-22 NOTE — H&P PST ADULT - PSYCHIATRIC
Affect and characteristics of appearance, verbalizations, behaviors are appropriate negative details… detailed exam

## 2018-02-22 NOTE — H&P PST ADULT - PMH
CHF (congestive heart failure)    Chronic atrial fibrillation    DM2 (diabetes mellitus, type 2)    Gastritis    HTN (hypertension)    Obesity    MIKE (obstructive sleep apnea)    Renal insufficiency CHF (congestive heart failure)  last acute episode 1/2017  DM2 (diabetes mellitus, type 2)    Fatty liver    Gastritis  hiatal hernia  HTN (hypertension)    Morbid obesity with BMI of 45.0-49.9, adult    MIKE (obstructive sleep apnea)    Paroxysmal A-fib  on Eliquis  Renal insufficiency  creat 2.2, GFR 31

## 2018-02-22 NOTE — H&P PST ADULT - HISTORY OF PRESENT ILLNESS
Patient is a 57 year old male who presents for PST prior to a ALVARO/CV. Patient with Pmhx of CHF, Afib, HTN, MIKE (on bipap), obesity. History dates back to 10 years ago when his EF was 41% but TTE in dec 2016 show an EF of 20-25%. S/P CRT-D implantation in March 2017. He presents for cardioversion because he has had persistent symptoms with ongoing functional limitations related to his Afib.       Patient with an insulin pump who will manage on his own that morning. Patient with elevated creatinine. Trending down. Should be repeated on day of procedure.       Cardiologist Dr. Oakley 57 yo male, PMH morbid obesity, while going through cardiac workup last year, for bariatric surgery, pt. was found to have a-fib S/P cardioversion 2017 (on Eliquis), CHF for about 10 years, last acute episode 1/2017,  s/p AICD/PPM 6/3/17 - EF improved from 20 to 40-45% on Echo from 11/20/17,  DM2 s/p insulin pump 2012, venous stasis with left leg ulcer recently and treated s/p bilateral lower extremity dopplers 2/2018 at [facility], MIKE on BIPAP, renal insufficiency. Pt. presents to PST today for scheduled Laparoscopic Vertical Sleeve Gastrectomy, Upper EGD on 3/6/18. Pt. HEREDIA due to weight, denies recent fever, chills, chest pain, urinary/bowel habits.    Pt. will hold Eliquis 4 days pre-op, last dose 3/1/18    Pt. has appointment to see cardiologist, Dr. Oakley,  on 3/2/18 for pre-op cardiac evaluation 59 yo male, PMH morbid obesity, while going through cardiac workup last year, for bariatric surgery, pt. was found to have a-fib S/P cardioversion 2017 (on Eliquis), CHF for about 10 years, last acute episode 1/2017,  s/p AICD/PPM 6/3/17 - EF improved from 20 to 40-45% on Echo from 11/20/17,  DM2 s/p insulin pump 2012, venous stasis with left leg ulcer recently and treated s/p bilateral lower extremity dopplers 2/7/2018 negative (Sebastian), MIKE on BIPAP, renal insufficiency. Pt. presents to PST today for scheduled Laparoscopic Vertical Sleeve Gastrectomy, Upper EGD on 3/6/18. Pt. HEREDIA due to weight, denies recent fever, chills, chest pain, urinary/bowel habits.    Pt. will hold Eliquis 4 days pre-op, last dose 3/1/18    Pt. has appointment to see cardiologist, Dr. Oakley,  on 3/2/18 for pre-op cardiac evaluation

## 2018-02-22 NOTE — H&P PST ADULT - PROBLEM SELECTOR PLAN 6
Pt. will hold Eliquis 4 days pre-op    REDCap: Interrupt DOAC. Bridging with LMWH not suggested for DOACs.

## 2018-02-22 NOTE — H&P PST ADULT - PSH
Artificial cardiac pacemaker AICD (automatic cardioverter/defibrillator) present  AICD/PPM [employer] Model #G158, Serial [id number] from 3/6/17  H/O sebaceous cyst  removed from upper and lower back

## 2018-02-22 NOTE — H&P PST ADULT - PROBLEM SELECTOR PLAN 1
Laparoscopic Vertical Sleeve Gastrectomy, Upper EGD  Pre-op education, including Chlorhexidine soap, provided - all questions answered  Pt. will hold Eliquis 4 days pre-op Laparoscopic Vertical Sleeve Gastrectomy, Upper EGD  Pre-op education, including Chlorhexidine soap, provided - all questions answered

## 2018-02-22 NOTE — H&P PST ADULT - OTHER CARE PROVIDERS
Dr. Mishra , Dr. Ramires, nephrologist, [phone], Dr. July Beckwith, endocrinologist, , Dr. Perlman, GI, [phone]

## 2018-02-22 NOTE — H&P PST ADULT - PROBLEM SELECTOR PLAN 3
Dr. Allison, endocrinologist on call, notified that patient has insulin pump, unable to come to talk to patient today, she will call them on cell phone.   Pt. has a plan with own endocrinologist and he will remove insulin pump DOS

## 2018-02-23 PROBLEM — I50.9 HEART FAILURE, UNSPECIFIED: Chronic | Status: ACTIVE | Noted: 2017-01-31

## 2018-02-23 NOTE — CHART NOTE - NSCHARTNOTEFT_GEN_A_CORE
Patient came to PST yesterday but covering doctor unable to see patient. I called him and his wife this am to review his plan. He is on U500 at home with the following settings:  Basal: 12am 1.5 units/hour 830am 1.1 units/hour 7pm 1.5 units/hour  Bolus: I:C 1:6 ISF 30 Target BS  IOB: 6 hours  He is currently on his 2 week pre-surgery liquid diet. He is supposed to call Dr. Lemon his endocrinologist to adjust setting further. AM bs are in the 110s. He uses a medronic 670. We agreed that he should disconnect his pump at home the morning of the surgery. Post-op his wife can bring it in and we can adjust his settings as he will likely need Humalog insulin - no longer would need Humulin U500 R. Full consult to be done the day of admission for surgery.

## 2018-03-06 ENCOUNTER — INPATIENT (INPATIENT)
Facility: HOSPITAL | Age: 58
LOS: 0 days | Discharge: HOME | DRG: 620 | End: 2018-03-07
Attending: SURGERY | Admitting: SURGERY
Payer: MEDICAID

## 2018-03-06 ENCOUNTER — RESULT REVIEW (OUTPATIENT)
Age: 58
End: 2018-03-06

## 2018-03-06 ENCOUNTER — APPOINTMENT (OUTPATIENT)
Dept: BARIATRICS | Facility: HOSPITAL | Age: 58
End: 2018-03-06
Payer: MEDICAID

## 2018-03-06 VITALS
HEART RATE: 70 BPM | RESPIRATION RATE: 20 BRPM | WEIGHT: 315 LBS | OXYGEN SATURATION: 99 % | DIASTOLIC BLOOD PRESSURE: 79 MMHG | HEIGHT: 75 IN | TEMPERATURE: 98 F | SYSTOLIC BLOOD PRESSURE: 120 MMHG

## 2018-03-06 DIAGNOSIS — E78.5 HYPERLIPIDEMIA, UNSPECIFIED: ICD-10-CM

## 2018-03-06 DIAGNOSIS — G47.33 OBSTRUCTIVE SLEEP APNEA (ADULT) (PEDIATRIC): ICD-10-CM

## 2018-03-06 DIAGNOSIS — I10 ESSENTIAL (PRIMARY) HYPERTENSION: ICD-10-CM

## 2018-03-06 DIAGNOSIS — Z87.2 PERSONAL HISTORY OF DISEASES OF THE SKIN AND SUBCUTANEOUS TISSUE: Chronic | ICD-10-CM

## 2018-03-06 DIAGNOSIS — E66.01 MORBID (SEVERE) OBESITY DUE TO EXCESS CALORIES: ICD-10-CM

## 2018-03-06 DIAGNOSIS — Z95.810 PRESENCE OF AUTOMATIC (IMPLANTABLE) CARDIAC DEFIBRILLATOR: Chronic | ICD-10-CM

## 2018-03-06 DIAGNOSIS — E11.22 TYPE 2 DIABETES MELLITUS WITH DIABETIC CHRONIC KIDNEY DISEASE: ICD-10-CM

## 2018-03-06 LAB
ANION GAP SERPL CALC-SCNC: 13 MMOL/L — SIGNIFICANT CHANGE UP (ref 5–17)
BASOPHILS # BLD AUTO: 0 K/UL — SIGNIFICANT CHANGE UP (ref 0–0.2)
BASOPHILS NFR BLD AUTO: 0.4 % — SIGNIFICANT CHANGE UP (ref 0–2)
BUN SERPL-MCNC: 55 MG/DL — HIGH (ref 7–23)
CALCIUM SERPL-MCNC: 8.5 MG/DL — SIGNIFICANT CHANGE UP (ref 8.4–10.5)
CHLORIDE SERPL-SCNC: 101 MMOL/L — SIGNIFICANT CHANGE UP (ref 96–108)
CO2 SERPL-SCNC: 26 MMOL/L — SIGNIFICANT CHANGE UP (ref 22–31)
CREAT SERPL-MCNC: 2.13 MG/DL — HIGH (ref 0.5–1.3)
EOSINOPHIL # BLD AUTO: 0 K/UL — SIGNIFICANT CHANGE UP (ref 0–0.5)
EOSINOPHIL NFR BLD AUTO: 0.5 % — SIGNIFICANT CHANGE UP (ref 0–6)
GLUCOSE BLDC GLUCOMTR-MCNC: 137 MG/DL — HIGH (ref 70–99)
GLUCOSE BLDC GLUCOMTR-MCNC: 150 MG/DL — HIGH (ref 70–99)
GLUCOSE BLDC GLUCOMTR-MCNC: 250 MG/DL — HIGH (ref 70–99)
GLUCOSE BLDC GLUCOMTR-MCNC: 253 MG/DL — HIGH (ref 70–99)
GLUCOSE SERPL-MCNC: 205 MG/DL — HIGH (ref 70–99)
HCT VFR BLD CALC: 40.1 % — SIGNIFICANT CHANGE UP (ref 39–50)
HGB BLD-MCNC: 13.3 G/DL — SIGNIFICANT CHANGE UP (ref 13–17)
LYMPHOCYTES # BLD AUTO: 0.7 K/UL — LOW (ref 1–3.3)
LYMPHOCYTES # BLD AUTO: 7 % — LOW (ref 13–44)
MAGNESIUM SERPL-MCNC: 2.2 MG/DL — SIGNIFICANT CHANGE UP (ref 1.6–2.6)
MCHC RBC-ENTMCNC: 30.3 PG — SIGNIFICANT CHANGE UP (ref 27–34)
MCHC RBC-ENTMCNC: 33.1 GM/DL — SIGNIFICANT CHANGE UP (ref 32–36)
MCV RBC AUTO: 91.5 FL — SIGNIFICANT CHANGE UP (ref 80–100)
MONOCYTES # BLD AUTO: 0.3 K/UL — SIGNIFICANT CHANGE UP (ref 0–0.9)
MONOCYTES NFR BLD AUTO: 2.9 % — SIGNIFICANT CHANGE UP (ref 2–14)
NEUTROPHILS # BLD AUTO: 8.3 K/UL — HIGH (ref 1.8–7.4)
NEUTROPHILS NFR BLD AUTO: 89.1 % — HIGH (ref 43–77)
PHOSPHATE SERPL-MCNC: 2.3 MG/DL — LOW (ref 2.5–4.5)
PLATELET # BLD AUTO: 126 K/UL — LOW (ref 150–400)
POTASSIUM SERPL-MCNC: 4.7 MMOL/L — SIGNIFICANT CHANGE UP (ref 3.5–5.3)
POTASSIUM SERPL-SCNC: 4.7 MMOL/L — SIGNIFICANT CHANGE UP (ref 3.5–5.3)
RBC # BLD: 4.39 M/UL — SIGNIFICANT CHANGE UP (ref 4.2–5.8)
RBC # FLD: 13.4 % — SIGNIFICANT CHANGE UP (ref 10.3–14.5)
SODIUM SERPL-SCNC: 140 MMOL/L — SIGNIFICANT CHANGE UP (ref 135–145)
WBC # BLD: 9.3 K/UL — SIGNIFICANT CHANGE UP (ref 3.8–10.5)
WBC # FLD AUTO: 9.3 K/UL — SIGNIFICANT CHANGE UP (ref 3.8–10.5)

## 2018-03-06 PROCEDURE — 88305 TISSUE EXAM BY PATHOLOGIST: CPT | Mod: 26

## 2018-03-06 PROCEDURE — 43775 LAP SLEEVE GASTRECTOMY: CPT

## 2018-03-06 PROCEDURE — 43235 EGD DIAGNOSTIC BRUSH WASH: CPT | Mod: GC

## 2018-03-06 PROCEDURE — 43775 LAP SLEEVE GASTRECTOMY: CPT | Mod: 80,GC

## 2018-03-06 PROCEDURE — 12345: CPT | Mod: NC

## 2018-03-06 PROCEDURE — 99223 1ST HOSP IP/OBS HIGH 75: CPT

## 2018-03-06 PROCEDURE — 43281 LAP PARAESOPHAG HERN REPAIR: CPT | Mod: 59

## 2018-03-06 RX ORDER — CEFAZOLIN SODIUM 1 G
3000 VIAL (EA) INJECTION EVERY 8 HOURS
Qty: 0 | Refills: 0 | Status: COMPLETED | OUTPATIENT
Start: 2018-03-06 | End: 2018-03-07

## 2018-03-06 RX ORDER — SODIUM CHLORIDE 9 MG/ML
1000 INJECTION, SOLUTION INTRAVENOUS
Qty: 0 | Refills: 0 | Status: DISCONTINUED | OUTPATIENT
Start: 2018-03-06 | End: 2018-03-07

## 2018-03-06 RX ORDER — DEXTROSE 50 % IN WATER 50 %
1 SYRINGE (ML) INTRAVENOUS ONCE
Qty: 0 | Refills: 0 | Status: DISCONTINUED | OUTPATIENT
Start: 2018-03-06 | End: 2018-03-07

## 2018-03-06 RX ORDER — CARVEDILOL PHOSPHATE 80 MG/1
12.5 CAPSULE, EXTENDED RELEASE ORAL EVERY 12 HOURS
Qty: 0 | Refills: 0 | Status: DISCONTINUED | OUTPATIENT
Start: 2018-03-06 | End: 2018-03-07

## 2018-03-06 RX ORDER — ACETAMINOPHEN 500 MG
1000 TABLET ORAL ONCE
Qty: 0 | Refills: 0 | Status: COMPLETED | OUTPATIENT
Start: 2018-03-06 | End: 2018-03-06

## 2018-03-06 RX ORDER — HEPARIN SODIUM 5000 [USP'U]/ML
5000 INJECTION INTRAVENOUS; SUBCUTANEOUS EVERY 8 HOURS
Qty: 0 | Refills: 0 | Status: DISCONTINUED | OUTPATIENT
Start: 2018-03-06 | End: 2018-03-07

## 2018-03-06 RX ORDER — SIMVASTATIN 20 MG/1
1 TABLET, FILM COATED ORAL
Qty: 0 | Refills: 0 | COMMUNITY

## 2018-03-06 RX ORDER — TIZANIDINE 4 MG/1
0 TABLET ORAL
Qty: 0 | Refills: 0 | COMMUNITY

## 2018-03-06 RX ORDER — PANTOPRAZOLE SODIUM 20 MG/1
40 TABLET, DELAYED RELEASE ORAL DAILY
Qty: 0 | Refills: 0 | Status: DISCONTINUED | OUTPATIENT
Start: 2018-03-06 | End: 2018-03-07

## 2018-03-06 RX ORDER — DEXTROSE 50 % IN WATER 50 %
12.5 SYRINGE (ML) INTRAVENOUS ONCE
Qty: 0 | Refills: 0 | Status: DISCONTINUED | OUTPATIENT
Start: 2018-03-06 | End: 2018-03-07

## 2018-03-06 RX ORDER — INSULIN GLARGINE 100 [IU]/ML
30 INJECTION, SOLUTION SUBCUTANEOUS AT BEDTIME
Qty: 0 | Refills: 0 | Status: DISCONTINUED | OUTPATIENT
Start: 2018-03-06 | End: 2018-03-07

## 2018-03-06 RX ORDER — DEXTROSE 50 % IN WATER 50 %
25 SYRINGE (ML) INTRAVENOUS ONCE
Qty: 0 | Refills: 0 | Status: DISCONTINUED | OUTPATIENT
Start: 2018-03-06 | End: 2018-03-06

## 2018-03-06 RX ORDER — ONDANSETRON 8 MG/1
4 TABLET, FILM COATED ORAL EVERY 6 HOURS
Qty: 0 | Refills: 0 | Status: DISCONTINUED | OUTPATIENT
Start: 2018-03-06 | End: 2018-03-07

## 2018-03-06 RX ORDER — INSULIN HUMAN 100 [IU]/ML
1 INJECTION, SOLUTION SUBCUTANEOUS
Qty: 0 | Refills: 0 | COMMUNITY

## 2018-03-06 RX ORDER — SODIUM CHLORIDE 9 MG/ML
3 INJECTION INTRAMUSCULAR; INTRAVENOUS; SUBCUTANEOUS EVERY 8 HOURS
Qty: 0 | Refills: 0 | Status: DISCONTINUED | OUTPATIENT
Start: 2018-03-06 | End: 2018-03-06

## 2018-03-06 RX ORDER — ACETAMINOPHEN 500 MG
1000 TABLET ORAL ONCE
Qty: 0 | Refills: 0 | Status: COMPLETED | OUTPATIENT
Start: 2018-03-07 | End: 2018-03-07

## 2018-03-06 RX ORDER — GLUCAGON INJECTION, SOLUTION 0.5 MG/.1ML
1 INJECTION, SOLUTION SUBCUTANEOUS ONCE
Qty: 0 | Refills: 0 | Status: DISCONTINUED | OUTPATIENT
Start: 2018-03-06 | End: 2018-03-07

## 2018-03-06 RX ORDER — INSULIN LISPRO 100/ML
VIAL (ML) SUBCUTANEOUS EVERY 6 HOURS
Qty: 0 | Refills: 0 | Status: DISCONTINUED | OUTPATIENT
Start: 2018-03-06 | End: 2018-03-06

## 2018-03-06 RX ORDER — SODIUM CHLORIDE 9 MG/ML
1000 INJECTION, SOLUTION INTRAVENOUS
Qty: 0 | Refills: 0 | Status: COMPLETED | OUTPATIENT
Start: 2018-03-06 | End: 2018-03-06

## 2018-03-06 RX ORDER — APIXABAN 2.5 MG/1
1 TABLET, FILM COATED ORAL
Qty: 60 | Refills: 0 | COMMUNITY

## 2018-03-06 RX ORDER — DEXTROSE 50 % IN WATER 50 %
25 SYRINGE (ML) INTRAVENOUS ONCE
Qty: 0 | Refills: 0 | Status: DISCONTINUED | OUTPATIENT
Start: 2018-03-06 | End: 2018-03-07

## 2018-03-06 RX ORDER — HEPARIN SODIUM 5000 [USP'U]/ML
5000 INJECTION INTRAVENOUS; SUBCUTANEOUS ONCE
Qty: 0 | Refills: 0 | Status: COMPLETED | OUTPATIENT
Start: 2018-03-06 | End: 2018-03-06

## 2018-03-06 RX ORDER — HYDROMORPHONE HYDROCHLORIDE 2 MG/ML
0.5 INJECTION INTRAMUSCULAR; INTRAVENOUS; SUBCUTANEOUS
Qty: 0 | Refills: 0 | Status: DISCONTINUED | OUTPATIENT
Start: 2018-03-06 | End: 2018-03-07

## 2018-03-06 RX ORDER — CEFAZOLIN SODIUM 1 G
3000 VIAL (EA) INJECTION ONCE
Qty: 0 | Refills: 0 | Status: DISCONTINUED | OUTPATIENT
Start: 2018-03-06 | End: 2018-03-07

## 2018-03-06 RX ORDER — PANTOPRAZOLE SODIUM 20 MG/1
1 TABLET, DELAYED RELEASE ORAL
Qty: 0 | Refills: 0 | COMMUNITY

## 2018-03-06 RX ORDER — LIDOCAINE HCL 20 MG/ML
0.2 VIAL (ML) INJECTION ONCE
Qty: 0 | Refills: 0 | Status: DISCONTINUED | OUTPATIENT
Start: 2018-03-06 | End: 2018-03-06

## 2018-03-06 RX ORDER — POTASSIUM CHLORIDE 20 MEQ
10 PACKET (EA) ORAL
Qty: 0 | Refills: 0 | Status: COMPLETED | OUTPATIENT
Start: 2018-03-06 | End: 2018-03-06

## 2018-03-06 RX ORDER — INSULIN LISPRO 100/ML
VIAL (ML) SUBCUTANEOUS EVERY 6 HOURS
Qty: 0 | Refills: 0 | Status: DISCONTINUED | OUTPATIENT
Start: 2018-03-06 | End: 2018-03-07

## 2018-03-06 RX ADMIN — Medication 1000 MILLIGRAM(S): at 17:20

## 2018-03-06 RX ADMIN — Medication 200 MILLIGRAM(S): at 19:17

## 2018-03-06 RX ADMIN — HEPARIN SODIUM 5000 UNIT(S): 5000 INJECTION INTRAVENOUS; SUBCUTANEOUS at 22:31

## 2018-03-06 RX ADMIN — INSULIN GLARGINE 30 UNIT(S): 100 INJECTION, SOLUTION SUBCUTANEOUS at 23:17

## 2018-03-06 RX ADMIN — Medication 1000 MILLIGRAM(S): at 23:50

## 2018-03-06 RX ADMIN — Medication 400 MILLIGRAM(S): at 23:23

## 2018-03-06 RX ADMIN — HEPARIN SODIUM 5000 UNIT(S): 5000 INJECTION INTRAVENOUS; SUBCUTANEOUS at 07:41

## 2018-03-06 RX ADMIN — Medication 4: at 18:07

## 2018-03-06 RX ADMIN — Medication 4: at 13:50

## 2018-03-06 RX ADMIN — Medication 400 MILLIGRAM(S): at 16:36

## 2018-03-06 RX ADMIN — PANTOPRAZOLE SODIUM 40 MILLIGRAM(S): 20 TABLET, DELAYED RELEASE ORAL at 15:17

## 2018-03-06 RX ADMIN — ONDANSETRON 4 MILLIGRAM(S): 8 TABLET, FILM COATED ORAL at 18:43

## 2018-03-06 RX ADMIN — HEPARIN SODIUM 5000 UNIT(S): 5000 INJECTION INTRAVENOUS; SUBCUTANEOUS at 15:25

## 2018-03-06 RX ADMIN — SODIUM CHLORIDE 150 MILLILITER(S): 9 INJECTION, SOLUTION INTRAVENOUS at 14:41

## 2018-03-06 RX ADMIN — SODIUM CHLORIDE 150 MILLILITER(S): 9 INJECTION, SOLUTION INTRAVENOUS at 13:51

## 2018-03-06 RX ADMIN — CARVEDILOL PHOSPHATE 12.5 MILLIGRAM(S): 80 CAPSULE, EXTENDED RELEASE ORAL at 19:13

## 2018-03-06 NOTE — PATIENT PROFILE ADULT. - PMH
CHF (congestive heart failure)  last acute episode 1/2017  DM2 (diabetes mellitus, type 2)    Fatty liver    Gastritis  hiatal hernia  HTN (hypertension)    Morbid obesity with BMI of 45.0-49.9, adult    MIKE (obstructive sleep apnea)    Paroxysmal A-fib  on Eliquis  Renal insufficiency  creat 2.2, GFR 31

## 2018-03-06 NOTE — CONSULT NOTE ADULT - PROBLEM SELECTOR RECOMMENDATION 9
-moderate scale insulin q6 as pt is still NPO  -Lantus 30 units sq qhs  -defer pump until diet is advanced. Pt will likely need to be transitioned to humalog.

## 2018-03-06 NOTE — PATIENT PROFILE ADULT. - PSH
AICD (automatic cardioverter/defibrillator) present  AICD/PPM [employer] Model #G158, Serial [id number] from 3/6/17  H/O sebaceous cyst  removed from upper and lower back

## 2018-03-06 NOTE — CONSULT NOTE ADULT - SUBJECTIVE AND OBJECTIVE BOX
HPI: 59 yo male with hx of T2DM (HbA1C 7.5%)x ?years with CKD 4 and neuropathy uncontrolled managed with insulin pump/U500 here s/p gastric sleeve. Patient was still sedated post-op from anesthesia so unable to get a history from him. We spoke by phone about a week ago and discussed the need for him to disconesct his pump the morning of surgery as his insulin requirement would decrease greatly post-op. The RN/CDE saw the patient pre-op and noted that he took it off. On my exam, no pump was noted to be attached to the patient.  Of notes the patient was on an insulin gtt at 4 units/hour during the case and he received decadron 12mg x1.    Endocrinologist: Dr. Yanelis Lemon    Settings  Basal: 12am 1.5 units/hour 830am 1.1 units/hour 7pm 1.5 units/hour  Bolus: I:C 1:6 ISF 30 Target BS  IOB: 6 hours        PAST MEDICAL & SURGICAL HISTORY:  Fatty liver  Morbid obesity with BMI of 45.0-49.9, adult  Paroxysmal A-fib: on Eliquis  Renal insufficiency: creat 2.2, GFR 31  Gastritis: hiatal hernia  MIKE (obstructive sleep apnea)  DM2 (diabetes mellitus, type 2)  CHF (congestive heart failure): last acute episode 1/2017  HTN (hypertension)  H/O sebaceous cyst: removed from upper and lower back  AICD (automatic cardioverter/defibrillator) present: AICD/PPM [employer] Model #G158, Serial [id number] from 3/6/17      FAMILY HISTORY:  Family history of CHF (congestive heart failure) (Father)  Family history of heart disease (Father)  Family history of stroke (Father)      Social History:  Tobacco: unable to obtain, per chart pt quit tobacco   ETOH: unable to obtain    Outpatient Medications: humulin U500 per pump, eliquis, gabapentin, enalapril, zocor, torsemide, protonix, tizanide    MEDICATIONS  (STANDING):  acetaminophen  IVPB. 1000 milliGRAM(s) IV Intermittent once  acetaminophen  IVPB. 1000 milliGRAM(s) IV Intermittent once  carvedilol 12.5 milliGRAM(s) Oral every 12 hours  ceFAZolin   IVPB 3000 milliGRAM(s) IV Intermittent every 8 hours  ceFAZolin   IVPB 3000 milliGRAM(s) IV Intermittent once  dextrose 5%. 1000 milliLiter(s) (50 mL/Hr) IV Continuous <Continuous>  dextrose 50% Injectable 12.5 Gram(s) IV Push once  dextrose 50% Injectable 25 Gram(s) IV Push once  heparin  Injectable 5000 Unit(s) SubCutaneous every 8 hours  insulin glargine Injectable (LANTUS) 30 Unit(s) SubCutaneous at bedtime  insulin lispro (HumaLOG) corrective regimen sliding scale   SubCutaneous every 6 hours  lactated ringers. 1000 milliLiter(s) (150 mL/Hr) IV Continuous <Continuous>  ondansetron Injectable 4 milliGRAM(s) IV Push every 6 hours  pantoprazole  Injectable 40 milliGRAM(s) IV Push daily    MEDICATIONS  (PRN):  aluminum hydroxide/magnesium hydroxide/simethicone Suspension 30 milliLiter(s) Oral every 4 hours PRN Dyspepsia  dextrose Gel 1 Dose(s) Oral once PRN Blood Glucose LESS THAN 70 milliGRAM(s)/deciliter  glucagon  Injectable 1 milliGRAM(s) IntraMuscular once PRN Glucose LESS THAN 70 milligrams/deciliter  HYDROmorphone  Injectable 0.5 milliGRAM(s) IV Push every 10 minutes PRN Moderate Pain (4 - 6)      Allergies  No Known Allergies    Review of Systems:  UNABLE TO OBTAIN    PHYSICAL EXAM:  VITALS: T(C): 36.6 (03-06-18 @ 12:30)  T(F): 97.9 (03-06-18 @ 12:30), Max: 97.9 (03-06-18 @ 07:13)  HR: 69 (03-06-18 @ 15:00) (69 - 72)  BP: 140/67 (03-06-18 @ 15:00) (117/69 - 140/67)  RR:  (14 - 20)  SpO2:  (99% - 100%)  Wt(kg): --  GENERAL: NAD, well-groomed, well-developed  EYES: +closed - no lesions of eyelids  HEENT:  Atraumatic, Normocephalic, moist mucous membranes  LYMPH: N anterior cervical or supraclavicular NICOLE  RESPIRATORY: Clear to auscultation bilaterally; No rales, rhonchi, wheezing, or rubs  CARDIOVASCULAR: Regular rate and rhythm; No murmurs; no peripheral edema  GI: Soft, nontender, non distended, normal bowel sounds  SKIN: Dry, intact, No rashes or lesions on feet b/l, +AN on neck with skin tags  PSYCH: +psychomotor retardation, +sedated    POCT Blood Glucose.: 137 mg/dL (03-06-18 @ 12:32)  POCT Blood Glucose.: 150 mg/dL (03-06-18 @ 07:32)                            13.3   9.3   )-----------( 126      ( 06 Mar 2018 13:11 )             40.1       03-06    140  |  101  |  55<H>  ----------------------------<  205<H>  4.7   |  26  |  2.13<H>    EGFR if : 38<L>  EGFR if non : 33<L>    Ca    8.5      03-06  Mg     2.2     03-06  Phos  2.3     03-06      Hemoglobin A1C, Whole Blood: 7.5 % <H> [4.0 - 5.6] (02-22-18 @ 12:48)

## 2018-03-06 NOTE — PROGRESS NOTE ADULT - ASSESSMENT
ASSESSMENT  58y male s/p Laparoscopic sleeve gastrectomy, Hiatal hernia repair , recovering without issue    PLAN    - Diet: NPO/ IVF  - Pain control with IV medications  - f/u Endo recs patient uses Insulin Pump at home    - Continue home medications  - OOB, ambulate as tolerated  - continue chemical VTE ppx    Green [phone]

## 2018-03-06 NOTE — ADVANCED PRACTICE NURSE CONSULT - RECOMMEDATIONS
S/S, prevention and appropriate treatment of hypo-hyperglycemia reviewed with pt and verbalized understanding obtained via the teach-back method.  Endocrine team to be contacted post-op.

## 2018-03-06 NOTE — ADVANCED PRACTICE NURSE CONSULT - ASSESSMENT
59 y/o male, PMH morbid obesity, while going through cardiac workup last year, for bariatric surgery, pt. was found to have a-fib S/P cardioversion 2017 (on Eliquis), CHF for about 10 years, last acute episode 1/2017,  s/p AICD/PPM 6/3/17 - EF improved from 20 to 40-45% on Echo from 11/20/17.   Pt. presents to [facility] today for scheduled Laparoscopic Vertical Sleeve Gastrectomy, Upper EGD.  Pt has been using insulin pump to manage his T2DM since 2012.  Pt instructed to remove insulin pump this morning prior to surgery which he did.  Pt’s wife will have insulin pump post-op and plan is that Endocrine team will lower his settings and change him to U100 insulin.  Pt has 3 sets of insulin pump supplies and insulin pump paperwork all completed.  Pt reports his fbs was 127 mg/dl and FS 7:32am in hospital 150 mg/dl.  Pt uses [employer] [id number] insulin pump without sensor and his current insulin pump settings are as follows;     Basal  12am – 1.5 units/hour  8:30am – 1.1 units/hour  7pm – 1.5 units/hour  Total daily basal - 31.8 units  ICR  12am-12am – 1:10  ISF  12am-12am – 30  BGT  12am-12am –  mg/dl  Insulin duration: 6 hours

## 2018-03-06 NOTE — PROGRESS NOTE ADULT - SUBJECTIVE AND OBJECTIVE BOX
STATUS POST:   Laparoscopic sleeve gastrectomy, Hiatal hernia repair     SUBJECTIVE: Pt seen + examined at bedside. Recovering without issue. Pain well controlled. Voiding and ambulating without issue. Denied n/v, CP or SOB.     ---------------------------------------------------------------------------------------------   VITALS  T(C): 36.7 (03-06-18 @ 20:00), Max: 36.7 (03-06-18 @ 20:00)  HR: 72 (03-06-18 @ 20:00) (69 - 90)  BP: 136/87 (03-06-18 @ 17:00) (117/69 - 158/73)  BP(mean): 105 (03-06-18 @ 17:00) (85 - 105)  RR: 16 (03-06-18 @ 20:00) (14 - 20)  SpO2: 98% (03-06-18 @ 20:00) (93% - 100%)  Wt(kg): --  CAPILLARY BLOOD GLUCOSE      POCT Blood Glucose.: 250 mg/dL (06 Mar 2018 18:02)  POCT Blood Glucose.: 137 mg/dL (06 Mar 2018 12:32)  POCT Blood Glucose.: 150 mg/dL (06 Mar 2018 07:32)      Is/Os    03-06 @ 07:01  -  03-06 @ 20:15  --------------------------------------------------------  IN:    IV PiggyBack: 150 mL    lactated ringers.: 600 mL    multivitamin/thiamine/folic acid in sodium chloride 0.9%: 1000 mL  Total IN: 1750 mL    OUT:    Indwelling Catheter - Urethral: 630 mL  Total OUT: 630 mL    Total NET: 1120 mL          ---------------------------------------------------------------------------------------------   PHYSICAL EXAM:  General: NAD, sitting in chair mode in bed  Neuro: alert, oriented x3  Cardio: RRR, nml S1/S2  Resp: Breathing comfortably on RA  GI/Abd: Soft, obese, distended, appropriately tender at epigastrium. Incisions CDI.   Musculoskeletal: All 4 extremities moving spontaneously, no limitations, no LE edema    ---------------------------------------------------------------------------------------------   MEDICATIONS (STANDING): acetaminophen  IVPB. 1000 milliGRAM(s) IV Intermittent once  carvedilol 12.5 milliGRAM(s) Oral every 12 hours  ceFAZolin   IVPB 3000 milliGRAM(s) IV Intermittent every 8 hours  ceFAZolin   IVPB 3000 milliGRAM(s) IV Intermittent once  dextrose 5%. 1000 milliLiter(s) IV Continuous <Continuous>  dextrose 50% Injectable 12.5 Gram(s) IV Push once  dextrose 50% Injectable 25 Gram(s) IV Push once  heparin  Injectable 5000 Unit(s) SubCutaneous every 8 hours  insulin glargine Injectable (LANTUS) 30 Unit(s) SubCutaneous at bedtime  insulin lispro (HumaLOG) corrective regimen sliding scale   SubCutaneous every 6 hours  lactated ringers. 1000 milliLiter(s) IV Continuous <Continuous>  ondansetron Injectable 4 milliGRAM(s) IV Push every 6 hours  pantoprazole  Injectable 40 milliGRAM(s) IV Push daily    MEDICATIONS (PRN):aluminum hydroxide/magnesium hydroxide/simethicone Suspension 30 milliLiter(s) Oral every 4 hours PRN Dyspepsia  dextrose Gel 1 Dose(s) Oral once PRN Blood Glucose LESS THAN 70 milliGRAM(s)/deciliter  glucagon  Injectable 1 milliGRAM(s) IntraMuscular once PRN Glucose LESS THAN 70 milligrams/deciliter  HYDROmorphone  Injectable 0.5 milliGRAM(s) IV Push every 10 minutes PRN Moderate Pain (4 - 6)      ---------------------------------------------------------------------------------------------   LABS  CBC (03-06 @ 13:11)                              13.3                           9.3     )----------------(  126<L>     89.1<H>% Neutrophils, 7.0<L>% Lymphocytes, ANC: 8.3<H>                              40.1      BMP (03-06 @ 13:11)             140     |  101     |  55<H> 		Ca++ --      Ca 8.5                ---------------------------------( 205<H>		Mg 2.2                4.7     |  26      |  2.13<H>			Ph 2.3<L>

## 2018-03-07 ENCOUNTER — TRANSCRIPTION ENCOUNTER (OUTPATIENT)
Age: 58
End: 2018-03-07

## 2018-03-07 VITALS
HEART RATE: 76 BPM | DIASTOLIC BLOOD PRESSURE: 80 MMHG | RESPIRATION RATE: 19 BRPM | SYSTOLIC BLOOD PRESSURE: 155 MMHG | OXYGEN SATURATION: 98 %

## 2018-03-07 DIAGNOSIS — Z46.81 ENCOUNTER FOR FITTING AND ADJUSTMENT OF INSULIN PUMP: ICD-10-CM

## 2018-03-07 LAB
ANION GAP SERPL CALC-SCNC: 16 MMOL/L — SIGNIFICANT CHANGE UP (ref 5–17)
BASOPHILS # BLD AUTO: 0 K/UL — SIGNIFICANT CHANGE UP (ref 0–0.2)
BASOPHILS NFR BLD AUTO: 0 % — SIGNIFICANT CHANGE UP (ref 0–2)
BUN SERPL-MCNC: 52 MG/DL — HIGH (ref 7–23)
CALCIUM SERPL-MCNC: 8.7 MG/DL — SIGNIFICANT CHANGE UP (ref 8.4–10.5)
CHLORIDE SERPL-SCNC: 101 MMOL/L — SIGNIFICANT CHANGE UP (ref 96–108)
CO2 SERPL-SCNC: 24 MMOL/L — SIGNIFICANT CHANGE UP (ref 22–31)
CREAT SERPL-MCNC: 2.01 MG/DL — HIGH (ref 0.5–1.3)
EOSINOPHIL # BLD AUTO: 0 K/UL — SIGNIFICANT CHANGE UP (ref 0–0.5)
EOSINOPHIL NFR BLD AUTO: 0.1 % — SIGNIFICANT CHANGE UP (ref 0–6)
ESTIMATED AVERAGE GLUCOSE: 163 MG/DL — HIGH (ref 68–114)
GLUCOSE BLDC GLUCOMTR-MCNC: 231 MG/DL — HIGH (ref 70–99)
GLUCOSE BLDC GLUCOMTR-MCNC: 240 MG/DL — HIGH (ref 70–99)
GLUCOSE SERPL-MCNC: 249 MG/DL — HIGH (ref 70–99)
HBA1C BLD-MCNC: 7.3 % — HIGH (ref 4–5.6)
HCT VFR BLD CALC: 41.6 % — SIGNIFICANT CHANGE UP (ref 39–50)
HGB BLD-MCNC: 13.8 G/DL — SIGNIFICANT CHANGE UP (ref 13–17)
LYMPHOCYTES # BLD AUTO: 0.7 K/UL — LOW (ref 1–3.3)
LYMPHOCYTES # BLD AUTO: 6.4 % — LOW (ref 13–44)
MCHC RBC-ENTMCNC: 30.3 PG — SIGNIFICANT CHANGE UP (ref 27–34)
MCHC RBC-ENTMCNC: 33.2 GM/DL — SIGNIFICANT CHANGE UP (ref 32–36)
MCV RBC AUTO: 91.3 FL — SIGNIFICANT CHANGE UP (ref 80–100)
MONOCYTES # BLD AUTO: 0.8 K/UL — SIGNIFICANT CHANGE UP (ref 0–0.9)
MONOCYTES NFR BLD AUTO: 6.8 % — SIGNIFICANT CHANGE UP (ref 2–14)
NEUTROPHILS # BLD AUTO: 9.7 K/UL — HIGH (ref 1.8–7.4)
NEUTROPHILS NFR BLD AUTO: 86.7 % — HIGH (ref 43–77)
PLATELET # BLD AUTO: 133 K/UL — LOW (ref 150–400)
POTASSIUM SERPL-MCNC: 4.8 MMOL/L — SIGNIFICANT CHANGE UP (ref 3.5–5.3)
POTASSIUM SERPL-SCNC: 4.8 MMOL/L — SIGNIFICANT CHANGE UP (ref 3.5–5.3)
RBC # BLD: 4.55 M/UL — SIGNIFICANT CHANGE UP (ref 4.2–5.8)
RBC # FLD: 13.2 % — SIGNIFICANT CHANGE UP (ref 10.3–14.5)
SODIUM SERPL-SCNC: 141 MMOL/L — SIGNIFICANT CHANGE UP (ref 135–145)
WBC # BLD: 11.1 K/UL — HIGH (ref 3.8–10.5)
WBC # FLD AUTO: 11.1 K/UL — HIGH (ref 3.8–10.5)

## 2018-03-07 PROCEDURE — 85027 COMPLETE CBC AUTOMATED: CPT

## 2018-03-07 PROCEDURE — 80048 BASIC METABOLIC PNL TOTAL CA: CPT

## 2018-03-07 PROCEDURE — 82962 GLUCOSE BLOOD TEST: CPT

## 2018-03-07 PROCEDURE — C1889: CPT

## 2018-03-07 PROCEDURE — 83036 HEMOGLOBIN GLYCOSYLATED A1C: CPT

## 2018-03-07 PROCEDURE — 88305 TISSUE EXAM BY PATHOLOGIST: CPT

## 2018-03-07 PROCEDURE — 84100 ASSAY OF PHOSPHORUS: CPT

## 2018-03-07 PROCEDURE — 99233 SBSQ HOSP IP/OBS HIGH 50: CPT

## 2018-03-07 PROCEDURE — 86900 BLOOD TYPING SEROLOGIC ABO: CPT

## 2018-03-07 PROCEDURE — 86901 BLOOD TYPING SEROLOGIC RH(D): CPT

## 2018-03-07 PROCEDURE — C1769: CPT

## 2018-03-07 PROCEDURE — 83735 ASSAY OF MAGNESIUM: CPT

## 2018-03-07 RX ORDER — OXYCODONE HYDROCHLORIDE 5 MG/1
5 TABLET ORAL
Qty: 180 | Refills: 0 | OUTPATIENT
Start: 2018-03-07 | End: 2018-03-10

## 2018-03-07 RX ORDER — SCOPALAMINE 1 MG/3D
1 PATCH, EXTENDED RELEASE TRANSDERMAL ONCE
Qty: 0 | Refills: 0 | Status: COMPLETED | OUTPATIENT
Start: 2018-03-07 | End: 2018-03-07

## 2018-03-07 RX ORDER — APIXABAN 2.5 MG/1
5 TABLET, FILM COATED ORAL EVERY 12 HOURS
Qty: 0 | Refills: 0 | Status: DISCONTINUED | OUTPATIENT
Start: 2018-03-07 | End: 2018-03-07

## 2018-03-07 RX ORDER — OXYCODONE HYDROCHLORIDE 5 MG/1
5 TABLET ORAL
Qty: 0 | Refills: 0 | Status: DISCONTINUED | OUTPATIENT
Start: 2018-03-07 | End: 2018-03-07

## 2018-03-07 RX ORDER — GABAPENTIN 400 MG/1
0 CAPSULE ORAL
Qty: 0 | Refills: 0 | COMMUNITY

## 2018-03-07 RX ORDER — OMEPRAZOLE 10 MG/1
1 CAPSULE, DELAYED RELEASE ORAL
Qty: 30 | Refills: 0 | OUTPATIENT
Start: 2018-03-07 | End: 2018-04-05

## 2018-03-07 RX ADMIN — Medication 4: at 12:40

## 2018-03-07 RX ADMIN — HEPARIN SODIUM 5000 UNIT(S): 5000 INJECTION INTRAVENOUS; SUBCUTANEOUS at 06:27

## 2018-03-07 RX ADMIN — OXYCODONE HYDROCHLORIDE 5 MILLIGRAM(S): 5 TABLET ORAL at 11:22

## 2018-03-07 RX ADMIN — Medication 6: at 00:11

## 2018-03-07 RX ADMIN — ONDANSETRON 4 MILLIGRAM(S): 8 TABLET, FILM COATED ORAL at 00:23

## 2018-03-07 RX ADMIN — CARVEDILOL PHOSPHATE 12.5 MILLIGRAM(S): 80 CAPSULE, EXTENDED RELEASE ORAL at 07:43

## 2018-03-07 RX ADMIN — Medication 1000 MILLIGRAM(S): at 07:00

## 2018-03-07 RX ADMIN — ONDANSETRON 4 MILLIGRAM(S): 8 TABLET, FILM COATED ORAL at 06:26

## 2018-03-07 RX ADMIN — Medication 4: at 06:04

## 2018-03-07 RX ADMIN — Medication 200 MILLIGRAM(S): at 01:04

## 2018-03-07 RX ADMIN — PANTOPRAZOLE SODIUM 40 MILLIGRAM(S): 20 TABLET, DELAYED RELEASE ORAL at 11:22

## 2018-03-07 RX ADMIN — Medication 400 MILLIGRAM(S): at 06:45

## 2018-03-07 RX ADMIN — ONDANSETRON 4 MILLIGRAM(S): 8 TABLET, FILM COATED ORAL at 11:22

## 2018-03-07 RX ADMIN — SCOPALAMINE 1 PATCH: 1 PATCH, EXTENDED RELEASE TRANSDERMAL at 13:43

## 2018-03-07 NOTE — DIETITIAN INITIAL EVALUATION ADULT. - PERTINENT MEDS FT
aluminum hydroxide/magnesium hydroxide/simethicone, Lantus, Humalog corrective sliding scale, Zofran, Protonix, lactated ringers

## 2018-03-07 NOTE — DIETITIAN INITIAL EVALUATION ADULT. - OTHER INFO
Consult received for bariatric surgery. Per chart pt has tried multiple wt loss attempts. Pt was unsure of weight before starting the 2 week presurgical diet recommended by outpatient RD, PST wt of 370 pounds and pt's presurgical wt was 360 pounds. Pt now S/P laparoscopic vertical sleeve gastrectomy. Pt reports N+V overnight as per RN around 3am, pt currently denies N+V, tolerating clear liquid bariatric diet at time of visit. Pt with knowledge of bariatric full liquid diet however receptive to in depth review/reinforcement. Pt states having protein shakes at home. Pt states his wife also purchased some of the necessary vitamins. Pt was advised to purchase chewable/liquid multivitamin with added elemental iron, chewable/liquid calcium citrate with vitamin D and sublingual B12. Pt was advised to take the chewable/liquid multivitamin with added elemental iron at least 2 hours apart from the chewable/liquid calcium citrate with vitamin D for optimal absorption. Pt states he plans to follow up with outpatient RD. Pt able to teach back all points discussed during interview.

## 2018-03-07 NOTE — DIETITIAN INITIAL EVALUATION ADULT. - ADHERENCE
Pt reports following a full liquid diet with 1 meal of lean protein and vegetables for 2 weeks PTA as instructed by outpatient RD. Pt reports having Premier protein shakes and 1 meal of chicken and vegetables.

## 2018-03-07 NOTE — PROGRESS NOTE ADULT - PROBLEM SELECTOR PROBLEM 1
Type 2 diabetes mellitus with stage 4 chronic kidney disease, with long-term current use of insulin no concerns

## 2018-03-07 NOTE — DIETITIAN INITIAL EVALUATION ADULT. - ENERGY NEEDS
Ht: 75“, Wt: 360lbs- presurgical, BMI: 45.0kg/m2, IBW: 196 lbs (+/-10%)  no edema or pressure injuries

## 2018-03-07 NOTE — DISCHARGE NOTE ADULT - CARE PROVIDER_API CALL
Guillermina Infante (MD), Surgery  [address] [place] [address]  Phone: [phone]  Fax: [phone]

## 2018-03-07 NOTE — PROGRESS NOTE ADULT - SUBJECTIVE AND OBJECTIVE BOX
Post Op Day#: 1    Subjective: Feels good, No abdominal pain, mild soreness. .    Objective: No cardio/pulmonary adverse event overnight, Pacemaker/AICD in place. Heart rate maintained 70-80's. OOB ambulated with assistance around the unit. Afebrile, v/s stable, . Tolerating bariatric liquid. Fingerstick in 200's. Pt received Lantus last night, ISS for glucose control. Denies nausea and vomiting Voiding.                                               Vital Signs Last 24 Hrs  T(C): 36.4 (07 Mar 2018 07:00), Max: 36.7 (06 Mar 2018 20:00)  T(F): 97.5 (07 Mar 2018 07:00), Max: 98.1 (06 Mar 2018 20:00)  HR: 76 (07 Mar 2018 10:00) (69 - 90)  BP: 136/87 (06 Mar 2018 17:00) (128/70 - 158/73)  BP(mean): 105 (06 Mar 2018 17:00) (93 - 105)  RR: 17 (07 Mar 2018 10:00) (14 - 18)  SpO2: 98% (07 Mar 2018 10:00) (93% - 100%)                                               I&O's Summary    06 Mar 2018 07:01  -  07 Mar 2018 07:00  --------------------------------------------------------  IN: 3400 mL / OUT: 1130 mL / NET: 2270 mL    07 Mar 2018 07:01  -  07 Mar 2018 14:47  --------------------------------------------------------  IN: 150 mL / OUT: 800 mL / NET: -650 mL    ORAL:  360+                                                                      13.8   11.1  )-----------( 133      ( 07 Mar 2018 03:49 )             41.6                                                 03-07    141  |  101  |  52<H>  ----------------------------<  249<H>  4.8   |  24  |  2.01<H>    Ca    8.7      07 Mar 2018 03:49  Phos  2.3     03-06  Mg     2.2     03-06        Physical Exam:         Lungs:  clear breath sounds b/l       Heart:  Regular rate & rhythm       Abdomen:  Soft, non-distended.  Scopes sites clean, dry and intact. + bs, - flatus, no rebound or guarding       Skin:  No rash       Extremities: + pulses, +1 edema, + venous stasis diseases , ulceration left lower extremities, mild oozing, dsg placed. no calf tenderness, negative       amna's                Caprini VTE Risk Score: = 5 (High) No VTE extended prophylaxis required post-discharge  [facility] VTE RISK SCORE: 15 (High) VTE extended post-operative prophylaxis required  (pt on Eliquis preop,)    Assessment and Plan: S/P Lap HH Repair, Sleeve Gastrectomy, EGD    - Bariatric Clear diet then protocol derived staged mal progression   - GI prophylaxis, Incentive spirometry  - Ambulation at least every 3 hours  - Procedure specific education including diet, vitamins and VTE prevention. Written materials given  - Resume Eliquis 5 mg bid, first dose tonight  - Medication reviewed and reconciled, Bariatric meds obtained from [facility]  - Insulin Pump activated prior to d/c by NP - endocrine , dose adjustment done   - Diabetic education and wound care  - D/C home once Bariatric 8-Point d/c criteria met   - D/c discussed with Dr Infante and Team  -  Follow up with Dr Infante in 7-10 days, Cardiologist, endocrinologist, Dietitian and PMD in 30 days.      Marian Barbosa, SUNIL, ANP  [phone]

## 2018-03-07 NOTE — PROGRESS NOTE ADULT - SUBJECTIVE AND OBJECTIVE BOX
Diabetes Follow up note:  Interval Hx:  58 year old male w/T2DM (managed w/insulin pump) w/morbid obesity, insulin resistance, chf, CKD4, neuropathy here s/p gastric sleeve surgery. Pt received decadron dose in OR (off insulin pump while inpatient) and received Lantus 30 last night + correction scale. BG values running 200's since surgery. Contacted by team as plan is for patient to be discharge home today. Pt seen at bedside. Tolerating clear liquid diet at this time. Pt does not have insulin pump with him as his wife brought it home last evening. Pt aware will need adjustments to pump settings prior to discharge.     Review of Systems:  General: Denies pain  GI: Tolerating POs without any N/V/D/ABD PAIN.  CV: No CP/SOB  ENDO: No S&Sx of hypoglycemia  MEDS:    insulin glargine Injectable (LANTUS) 30 Unit(s) SubCutaneous at bedtime  insulin lispro (HumaLOG) corrective regimen sliding scale   SubCutaneous every 6 hours      Allergies    No Known Allergies        PE:  General: Male sitting in chair. NAD.   Vital Signs Last 24 Hrs  T(C): 36.4 (07 Mar 2018 07:00), Max: 36.7 (06 Mar 2018 20:00)  T(F): 97.5 (07 Mar 2018 07:00), Max: 98.1 (06 Mar 2018 20:00)  HR: 76 (07 Mar 2018 10:00) (69 - 90)  BP: 136/87 (06 Mar 2018 17:00) (117/69 - 158/73)  BP(mean): 105 (06 Mar 2018 17:00) (85 - 105)  RR: 17 (07 Mar 2018 10:00) (14 - 18)  SpO2: 98% (07 Mar 2018 10:00) (93% - 100%)  CV: Paced rhythm on monitor.   Abd: Soft, NT,ND, Incision dsg c/d/i  Extremities: Warm  Neuro: A&O X3    LABS:    POCT Blood Glucose.: 231 mg/dL (03-07-18 @ 06:02)  POCT Blood Glucose.: 253 mg/dL (03-06-18 @ 22:52)  POCT Blood Glucose.: 250 mg/dL (03-06-18 @ 18:02)  POCT Blood Glucose.: 137 mg/dL (03-06-18 @ 12:32)  POCT Blood Glucose.: 150 mg/dL (03-06-18 @ 07:32)                            13.8   11.1  )-----------( 133      ( 07 Mar 2018 03:49 )             41.6       03-07    141  |  101  |  52<H>  ----------------------------<  249<H>  4.8   |  24  |  2.01<H>    Ca    8.7      07 Mar 2018 03:49  Phos  2.3     03-06  Mg     2.2     03-06            Hemoglobin A1C, Whole Blood: 7.3 % <H> [4.0 - 5.6] (03-07-18 @ 07:42)  Hemoglobin A1C, Whole Blood: 7.5 % <H> [4.0 - 5.6] (02-22-18 @ 12:48)            Contact number: tavo [phone] or [phone]

## 2018-03-07 NOTE — PROGRESS NOTE ADULT - PROBLEM SELECTOR PLAN 2
Wife to bring in insulin pump: adjustments as follows to basal:  12am: 0.45 units/hr  8:30am: 0.35 units/hr  7pm: 0.45  -Will need outpt follow w/Yanelis santoyo in 1-2 weeks  Discussed plan w/pt and team  pager: [phone]

## 2018-03-07 NOTE — DISCHARGE NOTE ADULT - MEDICATION SUMMARY - MEDICATIONS TO TAKE
I will START or STAY ON the medications listed below when I get home from the hospital:    oxyCODONE 5 mg/5 mL oral solution  -- 5 milliliter(s) by mouth every 4 hours, As Needed MDD:30   -- Caution federal law prohibits the transfer of this drug to any person other  than the person for whom it was prescribed.  May cause drowsiness.  Alcohol may intensify this effect.  Use care when operating dangerous machinery.  This prescription cannot be refilled.  Using more of this medication than prescribed may cause serious breathing problems.    -- Indication: For pain    enalapril 5 mg oral tablet  -- 1 tab(s) by mouth once a day  -- total 7.5 mg  -- Indication: For hypertension    Eliquis 5 mg oral tablet  -- 1 tab(s) by mouth 2 times a day  -- Indication: For Cardia Disease    gabapentin 300 mg oral capsule  --  by mouth 2 times a day. Do not swallow whole. Open capsule and mix in aplesauce  -- Indication: For neuropathy    HumuLIN R (Concentrated) 500 units/mL human recombinant subcutaneous solution  -- basal rate  insulin pump  -- Indication: For Diabetes    simvastatin 40 mg oral tablet  -- 1 tab(s) by mouth once a day (at bedtime)  -- Indication: For hyperlipidemia    carvedilol 12.5 mg oral tablet  -- 1 tab(s) by mouth every 12 hours  -- Indication: For cardiac surgery    torsemide 10 mg oral tablet  -- 1 tab(s) by mouth once a day  -- Indication: For Diurectic    tiZANidine 4 mg oral capsule  --  by mouth , As Needed  -- Indication: For Muslce relazant    pantoprazole 40 mg oral delayed release tablet  -- 1 tab(s) by mouth once a day  -- Indication: For reflux

## 2018-03-07 NOTE — PROGRESS NOTE ADULT - ASSESSMENT
59 yo male with hx of T2DM (HbA1C 7.5%)x ?years with CKD 4 and neuropathy uncontrolled managed with insulin pump/U500 here s/p gastric sleeve. Pt w/post-op hyperglycemia likely steroid induced. Tolerating POs. Will need large reduction of basal insulin post-gastric sleeve given calorie restriction and likely improvement of insulin resistance. BG goal (100-180mg/dl). To be discharged today-will adjust basal insulin settings to 30% of home dose. Will need close monitoring outpt and will need follow up ASAP with private endocrinologist. High level decision making-complex patient.

## 2018-03-07 NOTE — DISCHARGE NOTE ADULT - HOSPITAL COURSE
59 y/o male with PMH of HTN, Renal Insufficiency, MIKE on CPAP, morbid obesity , DM, AICD presents for scheduled Laparoscopic  vertical Sleeve.   On March 3/7/2018 He underwent Lap Gastrectomy, Hiatal Hernia Repair, EGD  as planned. Prior to start of procedure he received VTE and SSI prophylaxis. Following induction, indwelling valenzuela catheter placed which was removed 4 hours later. Procedure was uneventful and he was subsequently extubated and transferred to PACU.  He remained hemodynamically stable, had effective pain control. He was able to ambulate with assistance in the PACU and first time oob. He remained in PACU overnight for closer monitoring.    On POD#1 he remained stable, ambulating around the unit , and has effective pain control. Glucose control with SSI and he received Lantus last night. Pt seen by in-house endocrinology. Insulin Pump to be restarted today. He started a bariatric diet which he tolerated. Improved BUN/Creatine from baseline.  The rest of His hospital course was uneventful and he was cleared for discharge home. He was advised to resume Eliquis tonight, and follow up with his cardiologist and PMD in 30 days. Post-operative discharge instructions explained and given to include but not limited to blood VTE prevention. Pt will resume his home medications crush, and or liquid form. He will follow up with Dr Infante  in 7-10 days, his PMD and dietitian in 30 days.

## 2018-03-07 NOTE — DISCHARGE NOTE ADULT - PATIENT PORTAL LINK FT
You can access the [facility] Patient Portal, offered by [facility], by registering with the following website: [email or web address]

## 2018-03-07 NOTE — PROGRESS NOTE ADULT - ASSESSMENT
Assessment and Plan:  58y y/o Male s/p Laparoscopic sleeve gastrectomy  Hiatal hernia repair  , POD # 1    - Endocrine recs appreciated: ISS while NPO, Lantus 30 U QHS  - Start PO liquid oxycodone PRN for pain  - Continue ambulation   - Encourage Incentive Spirometer use  - Bariatric clears   - Continue chemical and mechanical DVT prophylaxis  - Discharge home once tolerating adequate PO and 8 point discharge criteria met    Discuss with attending

## 2018-03-07 NOTE — DISCHARGE NOTE ADULT - MEDICATION SUMMARY - MEDICATIONS TO STOP TAKING
I will STOP taking the medications listed below when I get home from the hospital:    pantoprazole 40 mg oral delayed release tablet  -- 1 tab(s) by mouth once a day    torsemide 10 mg oral tablet  -- 1 tab(s) by mouth once a day

## 2018-03-07 NOTE — PROGRESS NOTE ADULT - SUBJECTIVE AND OBJECTIVE BOX
Bariatric Surgery Progress Note    Post Op Day#: 1    24 hr events/Subjective:     No acute issues overnight  Pain controlled with medications  Nausea controlled with anti-ematics   Voiding      Procedure:  Laparoscopic sleeve gastrectomy  Hiatal hernia repair      Vital Signs Last 24 Hrs  T(C): 36.7 (06 Mar 2018 20:00), Max: 36.7 (06 Mar 2018 20:00)  T(F): 98.1 (06 Mar 2018 20:00), Max: 98.1 (06 Mar 2018 20:00)  HR: 72 (06 Mar 2018 23:00) (69 - 90)  BP: 136/87 (06 Mar 2018 17:00) (117/69 - 158/73)  BP(mean): 105 (06 Mar 2018 17:00) (85 - 105)  RR: 18 (06 Mar 2018 23:00) (14 - 20)  SpO2: 95% (06 Mar 2018 23:00) (93% - 100%)  Height (cm): 190.5 (03-06 @ 07:13)  Weight (kg): 163.3 (03-06 @ 07:13)  BMI (kg/m2): 45 (03-06 @ 07:13)  BSA (m2): 2.82 (03-06 @ 07:13)  I&O's Summary    06 Mar 2018 07:01  -  07 Mar 2018 00:34  --------------------------------------------------------  IN: 2200 mL / OUT: 630 mL / NET: 1570 mL      I&O's Detail    06 Mar 2018 07:01  -  07 Mar 2018 00:34  --------------------------------------------------------  IN:    IV PiggyBack: 150 mL    lactated ringers.: 1050 mL    multivitamin/thiamine/folic acid in sodium chloride 0.9%: 1000 mL  Total IN: 2200 mL    OUT:    Indwelling Catheter - Urethral: 630 mL  Total OUT: 630 mL    Total NET: 1570 mL          Physical Exam:    General:  Appears stated age, well-groomed, well-nourished, no distress  Chest:  clear breath sounds  Cardiovascular:  Regular rate & rhythm  Abdomen:  Soft, incisions clean, dry intact, tenderness around incisions, no rebound or guarding  Skin:  No rash  Neuro/Psych:  Alert, oriented to time, place and person                           13.3   9.3   )-----------( 126      ( 06 Mar 2018 13:11 )             40.1       03-06    140  |  101  |  55<H>  ----------------------------<  205<H>  4.7   |  26  |  2.13<H>    Ca    8.5      06 Mar 2018 13:11  Phos  2.3     03-06  Mg     2.2     03-06        acetaminophen  IVPB. milliGRAM(s) IV Intermittent once  aluminum hydroxide/magnesium hydroxide/simethicone Suspension milliLiter(s) Oral every 4 hours PRN  carvedilol milliGRAM(s) Oral every 12 hours  ceFAZolin   IVPB milliGRAM(s) IV Intermittent every 8 hours  ceFAZolin   IVPB milliGRAM(s) IV Intermittent once  dextrose 5%. milliLiter(s) IV Continuous <Continuous>  dextrose 50% Injectable Gram(s) IV Push once  dextrose 50% Injectable Gram(s) IV Push once  dextrose Gel Dose(s) Oral once PRN  glucagon  Injectable milliGRAM(s) IntraMuscular once PRN  heparin  Injectable Unit(s) SubCutaneous every 8 hours  HYDROmorphone  Injectable milliGRAM(s) IV Push every 10 minutes PRN  insulin glargine Injectable (LANTUS) Unit(s) SubCutaneous at bedtime  insulin lispro (HumaLOG) corrective regimen sliding scale  SubCutaneous every 6 hours  lactated ringers. milliLiter(s) IV Continuous <Continuous>  ondansetron Injectable milliGRAM(s) IV Push every 6 hours  pantoprazole  Injectable milliGRAM(s) IV Push daily

## 2018-03-07 NOTE — DISCHARGE NOTE ADULT - CARE PLAN
Principal Discharge DX:	Morbid obesity with BMI of 45.0-49.9, adult  Goal:	Pt will practice lifestyle changes for improvement in weight and quality of life  Assessment and plan of treatment:	pain management  Glucose control and diabetic care  Secondary Diagnosis:	AICD (automatic cardioverter/defibrillator) present

## 2018-03-07 NOTE — DISCHARGE NOTE ADULT - ADDITIONAL INSTRUCTIONS
Call Dr Infante office and confirm follow up appointment Call Dr Villanueva office and confirm follow up appointment.   preop Left lower leg venous statis disease with ulceration. As per Dr Villanueva's office (Aubrie GARCIA) Dr Villanueva  is aware of same and dressings changes are done by pt's wife daily.    pt advised to remove scopolamine patch after 3rd day or earlier if develops dizziness, blur vision.

## 2018-03-07 NOTE — DISCHARGE NOTE ADULT - PLAN OF CARE
Pt will practice lifestyle changes for improvement in weight and quality of life pain management  Glucose control and diabetic care

## 2018-03-07 NOTE — PROGRESS NOTE ADULT - ATTENDING COMMENTS
Agree with above.  Patient seen and examined.  Tolerating clears, good urine output, ambulating.  Possible discharge home later today.  Follow up in the office nest week, call with any concerns.

## 2018-03-08 ENCOUNTER — MESSAGE (OUTPATIENT)
Age: 58
End: 2018-03-08

## 2018-03-08 ENCOUNTER — TRANSCRIPTION ENCOUNTER (OUTPATIENT)
Age: 58
End: 2018-03-08

## 2018-03-14 LAB — SURGICAL PATHOLOGY STUDY: SIGNIFICANT CHANGE UP

## 2018-03-15 ENCOUNTER — APPOINTMENT (OUTPATIENT)
Dept: BARIATRICS | Facility: CLINIC | Age: 58
End: 2018-03-15
Payer: MEDICAID

## 2018-03-15 VITALS
DIASTOLIC BLOOD PRESSURE: 65 MMHG | SYSTOLIC BLOOD PRESSURE: 100 MMHG | WEIGHT: 315 LBS | HEIGHT: 74 IN | HEART RATE: 70 BPM | OXYGEN SATURATION: 97 % | BODY MASS INDEX: 40.43 KG/M2

## 2018-03-15 PROCEDURE — 99024 POSTOP FOLLOW-UP VISIT: CPT

## 2018-03-15 RX ORDER — PANTOPRAZOLE 40 MG/1
40 TABLET, DELAYED RELEASE ORAL
Refills: 0 | Status: COMPLETED | COMMUNITY
End: 2018-03-15

## 2018-03-15 RX ORDER — TORSEMIDE 10 MG/1
10 TABLET ORAL
Refills: 0 | Status: COMPLETED | COMMUNITY
End: 2018-03-15

## 2018-03-15 RX ORDER — ENALAPRIL MALEATE 5 MG/1
5 TABLET ORAL
Refills: 0 | Status: COMPLETED | COMMUNITY
End: 2018-03-15

## 2018-04-17 ENCOUNTER — APPOINTMENT (OUTPATIENT)
Dept: BARIATRICS | Facility: CLINIC | Age: 58
End: 2018-04-17
Payer: MEDICAID

## 2018-04-17 VITALS
BODY MASS INDEX: 40.43 KG/M2 | WEIGHT: 315 LBS | OXYGEN SATURATION: 95 % | SYSTOLIC BLOOD PRESSURE: 110 MMHG | HEART RATE: 71 BPM | DIASTOLIC BLOOD PRESSURE: 70 MMHG | HEIGHT: 74 IN

## 2018-04-17 DIAGNOSIS — Z87.19 OTHER SPECIFIED POSTPROCEDURAL STATES: ICD-10-CM

## 2018-04-17 DIAGNOSIS — Z87.19 PERSONAL HISTORY OF OTHER DISEASES OF THE DIGESTIVE SYSTEM: ICD-10-CM

## 2018-04-17 DIAGNOSIS — E66.01 MORBID (SEVERE) OBESITY DUE TO EXCESS CALORIES: ICD-10-CM

## 2018-04-17 DIAGNOSIS — K91.2 POSTSURGICAL MALABSORPTION, NOT ELSEWHERE CLASSIFIED: ICD-10-CM

## 2018-04-17 DIAGNOSIS — Z98.84 BARIATRIC SURGERY STATUS: ICD-10-CM

## 2018-04-17 DIAGNOSIS — K22.70 BARRETT'S ESOPHAGUS W/OUT DYSPLASIA: ICD-10-CM

## 2018-04-17 DIAGNOSIS — Z98.890 OTHER SPECIFIED POSTPROCEDURAL STATES: ICD-10-CM

## 2018-04-17 DIAGNOSIS — Z90.3 POSTSURGICAL MALABSORPTION, NOT ELSEWHERE CLASSIFIED: ICD-10-CM

## 2018-04-17 PROCEDURE — 99024 POSTOP FOLLOW-UP VISIT: CPT

## 2018-04-17 RX ORDER — GINGER ROOT/GINGER ROOT EXT 262.5 MG
CAPSULE ORAL
Refills: 0 | Status: ACTIVE | COMMUNITY

## 2018-04-17 RX ORDER — PANTOPRAZOLE 40 MG/1
40 TABLET, DELAYED RELEASE ORAL DAILY
Refills: 0 | Status: ACTIVE | COMMUNITY
Start: 2018-04-17

## 2018-04-17 RX ORDER — PNV NO.95/FERROUS FUM/FOLIC AC 28MG-0.8MG
TABLET ORAL
Refills: 0 | Status: ACTIVE | COMMUNITY

## 2018-04-17 RX ORDER — OMEPRAZOLE 40 MG/1
40 CAPSULE, DELAYED RELEASE ORAL
Refills: 0 | Status: COMPLETED | COMMUNITY

## 2018-04-17 RX ORDER — MULTIVITAMIN
TABLET ORAL
Refills: 0 | Status: ACTIVE | COMMUNITY

## 2018-06-15 LAB
25(OH)D3 SERPL-MCNC: 25.2 NG/ML
ALBUMIN SERPL ELPH-MCNC: 4.2 G/DL
ALP BLD-CCNC: 60 U/L
ALT SERPL-CCNC: 5 U/L
ANION GAP SERPL CALC-SCNC: 15 MMOL/L
AST SERPL-CCNC: 18 U/L
BASOPHILS # BLD AUTO: 0.01 K/UL
BASOPHILS NFR BLD AUTO: 0.2 %
BILIRUB SERPL-MCNC: 0.9 MG/DL
BUN SERPL-MCNC: 33 MG/DL
CALCIUM SERPL-MCNC: 9.1 MG/DL
CHLORIDE SERPL-SCNC: 102 MMOL/L
CHOLEST SERPL-MCNC: 111 MG/DL
CHOLEST/HDLC SERPL: 4 RATIO
CO2 SERPL-SCNC: 26 MMOL/L
CREAT SERPL-MCNC: 1.55 MG/DL
EOSINOPHIL # BLD AUTO: 0.24 K/UL
EOSINOPHIL NFR BLD AUTO: 3.7 %
FERRITIN SERPL-MCNC: 48 NG/ML
FOLATE SERPL-MCNC: >20 NG/ML
GLUCOSE SERPL-MCNC: 214 MG/DL
HBA1C MFR BLD HPLC: 8.1 %
HCT VFR BLD CALC: 43.7 %
HDLC SERPL-MCNC: 28 MG/DL
HGB BLD-MCNC: 13.6 G/DL
IMM GRANULOCYTES NFR BLD AUTO: 0.2 %
IRON SERPL-MCNC: 101 UG/DL
LDLC SERPL CALC-MCNC: 56 MG/DL
LYMPHOCYTES # BLD AUTO: 1.36 K/UL
LYMPHOCYTES NFR BLD AUTO: 20.9 %
MAN DIFF?: NORMAL
MCHC RBC-ENTMCNC: 27.3 PG
MCHC RBC-ENTMCNC: 31.1 GM/DL
MCV RBC AUTO: 87.6 FL
MONOCYTES # BLD AUTO: 0.67 K/UL
MONOCYTES NFR BLD AUTO: 10.3 %
NEUTROPHILS # BLD AUTO: 4.22 K/UL
NEUTROPHILS NFR BLD AUTO: 64.7 %
PLATELET # BLD AUTO: 153 K/UL
POTASSIUM SERPL-SCNC: 4.4 MMOL/L
PROT SERPL-MCNC: 7.3 G/DL
RBC # BLD: 4.99 M/UL
RBC # FLD: 14.3 %
SODIUM SERPL-SCNC: 143 MMOL/L
TRIGL SERPL-MCNC: 136 MG/DL
TSH SERPL-ACNC: 1.37 UIU/ML
VIT B12 SERPL-MCNC: 848 PG/ML
WBC # FLD AUTO: 6.51 K/UL
ZINC SERPL-MCNC: 83 UG/DL

## 2018-06-18 LAB
VIT A SERPL-MCNC: 64.8 UG/DL
VIT B2 SERPL-MCNC: 269 UG/L
VIT B6 SERPL-MCNC: 10.8 UG/L

## 2018-06-22 LAB — VIT B1 SERPL-MCNC: 177.5 NMOL/L

## 2018-07-16 PROBLEM — I95.9 HYPOTENSION, UNSPECIFIED: Chronic | Status: INACTIVE | Noted: 2017-03-01 | Resolved: 2018-02-22

## 2018-07-16 PROBLEM — K29.70 GASTRITIS, UNSPECIFIED, WITHOUT BLEEDING: Chronic | Status: ACTIVE | Noted: 2017-03-01

## 2018-07-16 PROBLEM — N28.9 DISORDER OF KIDNEY AND URETER, UNSPECIFIED: Chronic | Status: ACTIVE | Noted: 2017-03-01

## 2018-07-16 PROBLEM — I48.2 CHRONIC ATRIAL FIBRILLATION: Chronic | Status: INACTIVE | Noted: 2017-01-31 | Resolved: 2018-02-22

## 2018-07-16 PROBLEM — E66.9 OBESITY, UNSPECIFIED: Chronic | Status: INACTIVE | Noted: 2017-03-01 | Resolved: 2018-02-22

## 2018-07-17 PROBLEM — I48.0 PAROXYSMAL ATRIAL FIBRILLATION: Chronic | Status: ACTIVE | Noted: 2018-02-22

## 2018-08-21 ENCOUNTER — APPOINTMENT (OUTPATIENT)
Dept: BARIATRICS | Facility: CLINIC | Age: 58
End: 2018-08-21

## 2018-08-21 ENCOUNTER — APPOINTMENT (OUTPATIENT)
Dept: BARIATRICS/WEIGHT MGMT | Facility: CLINIC | Age: 58
End: 2018-08-21

## 2018-10-11 NOTE — H&P PST ADULT - ASSESSMENT
Dr. Michelle Powers was called and made aware of pt's blood sugar 378. No new orders. There have been no changes in the patients condition .

## 2019-03-05 NOTE — CONSULT NOTE ADULT - ASSESSMENT
This patient is currently active with [employer] for home 02. Original order was for 2 lpm cont via nc. Requirement change has been ordered for 8 lpm cont via nc.  10 liter concentrator has been arranged for delivery. Pt aware to call [facility] [phone] to initiate delivery. Portable tank has been delivered to the patients room.     Electronically signed by Mabel Peres on 3/5/2019 at 2:33 PM 59 yo male with hx of T2DM (HbA1C 7.5%)x ?years with CKD 4 and neuropathy uncontrolled managed with insulin pump/U500 here s/p gastric sleeve.

## 2020-02-28 NOTE — H&P PST ADULT - ALLERGIC/IMMUNOLOGIC
What Is The Reason For Today's Visit?: Full Body Skin Examination What Is The Reason For Today's Visit? (Being Monitored For X): concerning skin lesions on an annual basis negative

## 2020-03-14 NOTE — H&P PST ADULT - TOBACCO USE
Patient with a known history of atrial fibrillation status post 2 attempts at cardioversion that have failed  Patient presents with increasing lower extremity edema found to be in AFib with RVR      · Appreciate heart failure input- have consulted EP   · Continue diuresis  · Continue amiodarone drip  · Continue Toprol 100mg BID  · Continue Eliquis  · 2D echo pending Former smoker

## 2020-04-23 NOTE — H&P PST ADULT - PROBLEM SELECTOR PLAN 5
ObHx:   at 36+ weeks; cHTN and GDMA2 on insulin; 3ag09nu (per patient- normal GTT postpartum)   SABx1, no D&C     GynHx: h/o PCOS; h/o HPV years ago s/p normal colposcopy; denies h/o fibroids, STIs
Called Dr. Oakley office, cardiologist, for AICD/PPM device interrogation from 10/2017 to [facility]

## 2020-08-03 NOTE — H&P PST ADULT - ADDITIONAL PE
Grandma is calling to see if she is able to get an appointment for the patient she has a fever and very nasale and grandma is concerned please call her back thank you   
Ok for the nose spray and or antihistamine for congestion.   
Writer notified patient guardian of DO recommendations.  Guardian verbalizes understanding, no further questions at this time.      Discussed OTC cetirizine and dosing, Guardian also states that she does have OTC nasonex already at home.     Advised to contact office with any further questions or concerns, new or worsening s/s.               
Writer returned call to grandmother.   C/o-   Onset 08/01/2020  Feeling warm to touch - does not have thermometer, is not feeling warm to touch currently.  Fatigue.  Sore throat.  Mild non productive cough.      Denies wheezing, aches/chills, SOB, n/v, diarrhea.    Medication tried dayquil - Acetaminophen, dextromethorphan, phenylephrine.  - Seems to be helping with temperature, but not sinus drainage/sore throat and/or congestion.    Writer advised grandmother on elevation, humidification, aroma therapy - vicks vapor rub, hydration, sinus washes for symptoms.     Grandmother is not interested in COVID-19 testing at this time.     Advised to continue to monitor symptoms closely and call office back with any new onset, persistent, or worsening s/s.  Discussed OTC ibuprofen.  Discussed CDC guidelines regarding social distancing/isolation, hand hygiene, cough hygiene.    Grandmother is interested if DO has any additional recommendations/medications - medications for s/s relief.  
ASA 3  Mallampati 3

## 2021-09-16 NOTE — PHARMACY COMMUNICATION NOTE - COMMENTS
Patient medication reconciliation done. Patient currently taking:     Carvedilol 12.5mg tab by mouth twice daily  Apixaban (eliquis) 5mg tab by mouth twice daily  Enalapril 5mg tab by mouth daily  Gabapentin 300mg cap by mouth twice daily  Humulin R 500units/mL pump per Dr. Lemon (endo)  Pantoprazole 40mg DR tab by mouth daily  Simvastatin 40mg by mouth at bedtime  Tizanidine 4mg by mouth daily as needed  Torsemide 10mg tab - alternate 1-2 tabs by mouth daily (20mg three times a week)    Patient was informed to take daily multivitamins post surgically. Patient reeducated on NSAID avoidance (ibuprofen, ASA, naproxen, aleve) as they increased risk of GI bleeding. Patient informed to use APAP for mild pain otherwise contact prescriber for consult. Patient educated to monitor for dehydration with torsemide use as PO fluid intake may decrease after surgery - consult with cardiologist/nephrologist for adjustments. Patient was informed on indications and directions for administration for hyoscyamine SL, oxycodone liquid, ondansetron ODT, and omeprazole . Patient was instructed to take the medications as follows:     Crush carvedilol, apixaban, enalapril, simvastatin, tizanidine  Open gabapentin capsules  Switch pantoprazole to omeprazole DR cap (may open)  Continue insulin pump as directed by Dr. Lemon (endo)  Continue torsemide (crush) as directed by Dr. Oakley (cardio)
Clear bilaterally, pupils equal, round and reactive to light.

## 2022-04-15 NOTE — ASU PATIENT PROFILE, ADULT - TEACHING/LEARNING FACTORS IMPACT ABILITY TO LEARN
Patient presents today for cough and fever.    Medication Reconciliation Complete    Camila Pierre LPN  4/15/2022 8:53 AM   none

## 2022-10-19 NOTE — ASU PATIENT PROFILE, ADULT - TEACHING/LEARNING LEARNING PREFERENCES
ivl video/individual instruction/written material/audio/pictorial/computer/internet/group instruction/skill demonstration/verbal instruction

## 2022-11-14 NOTE — DISCHARGE NOTE ADULT - PROVIDER RX CONTACT NUMBER
[phone] [facility] Pregnancy And Lactation Text: This medication is Pregnancy Category C and it isn't known if it is safe during pregnancy. It is unknown if it is excreted in breast milk.

## 2022-11-28 NOTE — ASU PATIENT PROFILE, ADULT - CAREGIVER PHONE NUMBER
GENERAL SURGERY PROGRESS NOTE    SUBJECTIVE  Patient seen and examined. Intubated,     OBJECTIVE    PHYSICAL EXAM  General: Appears well, NAD  CHEST: intubated  CV: appears well perfused  Abdomen: soft, nontender, nondistended, no rebound or guarding  Extremities: Grossly symmetric    T(C): 36.7 (11-28-22 @ 16:00), Max: 37 (11-28-22 @ 00:00)  HR: 75 (11-28-22 @ 19:30) (64 - 80)  BP: --  RR: 14 (11-28-22 @ 19:30) (14 - 19)  SpO2: 100% (11-28-22 @ 19:30) (100% - 100%)    11-27-22 @ 07:01  -  11-28-22 @ 07:00  --------------------------------------------------------  IN: 2330 mL / OUT: 2810 mL / NET: -480 mL    11-28-22 @ 07:01  -  11-28-22 @ 19:49  --------------------------------------------------------  IN: 1433.1 mL / OUT: 1650 mL / NET: -216.9 mL        MEDICATIONS  artificial  tears Solution 1 Drop(s) Both EYES four times a day  chlorhexidine 0.12% Liquid 15 milliLiter(s) Oral Mucosa every 12 hours  chlorhexidine 2% Cloths 1 Application(s) Topical <User Schedule>  chlorhexidine 4% Liquid 1 Application(s) Topical <User Schedule>  dexMEDEtomidine Infusion 1.5 MICROgram(s)/kG/Hr IV Continuous <Continuous>  dextrose 50% Injectable 50 milliLiter(s) IV Push every 15 minutes  heparin  Infusion 500 Unit(s)/Hr IV Continuous <Continuous>  hydrocortisone sodium succinate Injectable 25 milliGRAM(s) IV Push every 8 hours  insulin lispro (ADMELOG) corrective regimen sliding scale   SubCutaneous every 6 hours  insulin regular Infusion 6 Unit(s)/Hr IV Continuous <Continuous>  meropenem  IVPB 500 milliGRAM(s) IV Intermittent every 12 hours  pantoprazole  Injectable 40 milliGRAM(s) IV Push two times a day  polyethylene glycol 3350 17 Gram(s) Oral daily  senna 2 Tablet(s) Oral at bedtime  sodium chloride 0.9% lock flush 10 milliLiter(s) IV Push every 1 hour PRN  sodium chloride 0.9%. 1000 milliLiter(s) IV Continuous <Continuous>  vasopressin Infusion 0.027 Unit(s)/Min IV Continuous <Continuous>      LABS                        8.5    13.88 )-----------( 102      ( 28 Nov 2022 00:57 )             26.0     11-28    152<H>  |  112<H>  |  63<H>  ----------------------------<  157<H>  4.0   |  25  |  3.95<H>    Ca    8.7      28 Nov 2022 00:58  Phos  6.5     11-28  Mg     2.4     11-28    TPro  6.1  /  Alb  3.2<L>  /  TBili  0.8  /  DBili  x   /  AST  228<H>  /  ALT  72<H>  /  AlkPhos  63  11-28    PT/INR - ( 28 Nov 2022 04:28 )   PT: 11.7 sec;   INR: 1.01 ratio         PTT - ( 28 Nov 2022 13:04 )  PTT:40.6 sec      RADIOLOGY & ADDITIONAL STUDIES same

## 2023-09-06 NOTE — H&P PST ADULT - PRIMARY CARE PROVIDER
Dr. Navarro Montano , Dr. Oakley, cardiologist , [facility], same number Suturegard Intro: Intraoperative tissue expansion was performed, utilizing the SUTUREGARD device, in order to reduce wound tension.

## 2024-05-04 NOTE — DISCHARGE NOTE ADULT - INSTRUCTIONS
Bariatric phase 1 full liquid diet for 2 weeks, then advance as per your dietitian instructions Oriented - self; Oriented - place; Oriented - time

## 2025-05-16 NOTE — H&P PST ADULT - NSANTHOSAYNRD_GEN_A_CORE
exercises requiring 1-2 UE support during foam balance and gait drills. End of tx pt did note LE fatigue, however stated she did stay in the water this morning longer than she usually does on therapy days.  Will continue to progress bhavana LE strength and balance as tolerarted.  Treatment Diagnosis: LBP, decreased lumbar and B hip AROM, decreased B LE and core strength, impaired balance, impaired gait, and decreased activity tolerance  Therapy Prognosis: Good       Patient Education: [x] NA       Post-Pain Assessment:       Pain Rating (0-10 pain scale):   0/10   Location and pain description same as pre-treatment unless indicated.   Action: [] NA   [x] Perform HEP  [] Meds as prescribed  [] Modalities as prescribed   [] Call Physician     GOALS   Patient Goal(s): Patient Goals : Increase leg and core strength, no falls    Long Term Goals Completed by 6 weeks Goal Status   LTG 1 The pt will be indep/compliant with HEP to self manage indep upon D/C In progress   LTG 2 The pt will have a decrease in EVELYN score >/=10 points in order to increase functional activity tolerance In progress   LTG 3 The pt will demo improved lumbar ext, B SB, and Rotation AROM >/=25%  in order to increase ease of ADL's In progress   LTG 4 The pt will demo improved B hip IR/ER/Flex AROM >/=10* ea in order to improve lumbopelvic mechanics and decrease pain with ADL's In progress   LTG 5 The pt will demo improved B LE strength >/= 4+ to 5/5 and core strength >/=fair in order to improve upright posture with standing and walking In progress   LTG 6 The pt will demo 5x sit to stand and TUG </=13 secondsand B SLS >/=5\" in order to increase safety with functional mobility In progress   LTG 7 The pt will demo improved DGI >/=18 to improve dynamic and functional balance and decrease risk for falls In progress            Plan:  Frequency/Duration:  Plan  Plan Frequency: 2  Plan weeks: 6  Current Treatment Recommendations: Strengthening, ROM, Balance 
Yes